# Patient Record
Sex: MALE | Race: WHITE | NOT HISPANIC OR LATINO | Employment: OTHER | URBAN - NONMETROPOLITAN AREA
[De-identification: names, ages, dates, MRNs, and addresses within clinical notes are randomized per-mention and may not be internally consistent; named-entity substitution may affect disease eponyms.]

---

## 2023-02-23 PROBLEM — R35.1 NOCTURIA: Status: ACTIVE | Noted: 2023-02-23

## 2023-02-23 PROBLEM — E03.9 HYPOTHYROID: Status: ACTIVE | Noted: 2023-02-23

## 2023-02-23 PROBLEM — L30.8 PRURITIC DERMATITIS: Status: ACTIVE | Noted: 2023-02-23

## 2023-02-23 PROBLEM — I25.10 CAD (CORONARY ARTERY DISEASE): Status: ACTIVE | Noted: 2023-02-23

## 2023-02-23 PROBLEM — N20.0 KIDNEY STONE: Status: ACTIVE | Noted: 2023-02-23

## 2023-02-23 PROBLEM — E78.5 HYPERLIPIDEMIA: Status: ACTIVE | Noted: 2023-02-23

## 2023-02-23 PROBLEM — E11.9 DIABETES MELLITUS (MULTI): Status: ACTIVE | Noted: 2023-02-23

## 2023-02-23 PROBLEM — M25.561 RIGHT KNEE PAIN: Status: ACTIVE | Noted: 2023-02-23

## 2023-02-23 PROBLEM — F51.01 PRIMARY INSOMNIA: Status: ACTIVE | Noted: 2023-02-23

## 2023-02-23 PROBLEM — M47.817 LUMBOSACRAL SPONDYLOSIS: Status: ACTIVE | Noted: 2023-02-23

## 2023-02-23 PROBLEM — F41.1 ANXIETY, GENERALIZED: Status: ACTIVE | Noted: 2023-02-23

## 2023-02-23 PROBLEM — M47.816 ARTHRITIS OF FACET JOINT OF LUMBAR SPINE: Status: ACTIVE | Noted: 2023-02-23

## 2023-02-23 PROBLEM — M17.11 ARTHRITIS OF KNEE, RIGHT: Status: ACTIVE | Noted: 2023-02-23

## 2023-02-23 PROBLEM — M53.9 MULTILEVEL DEGENERATIVE DISC DISEASE: Status: ACTIVE | Noted: 2023-02-23

## 2023-02-23 PROBLEM — I10 HTN (HYPERTENSION), BENIGN: Status: ACTIVE | Noted: 2023-02-23

## 2023-02-23 PROBLEM — N40.0 BPH (BENIGN PROSTATIC HYPERPLASIA): Status: ACTIVE | Noted: 2023-02-23

## 2023-02-23 PROBLEM — R33.9 URINARY RETENTION: Status: ACTIVE | Noted: 2023-02-23

## 2023-02-23 PROBLEM — R97.20 ELEVATED PSA MEASUREMENT: Status: ACTIVE | Noted: 2023-02-23

## 2023-02-23 PROBLEM — N41.1 CHRONIC PROSTATITIS: Status: ACTIVE | Noted: 2023-02-23

## 2023-02-23 PROBLEM — K21.9 GERD (GASTROESOPHAGEAL REFLUX DISEASE): Status: ACTIVE | Noted: 2023-02-23

## 2023-02-23 PROBLEM — N52.9 ERECTILE DYSFUNCTION: Status: ACTIVE | Noted: 2023-02-23

## 2023-02-23 PROBLEM — C18.9 COLON CANCER (MULTI): Status: ACTIVE | Noted: 2023-02-23

## 2023-02-23 PROBLEM — N40.0 ENLARGED PROSTATE: Status: ACTIVE | Noted: 2023-02-23

## 2023-02-23 RX ORDER — DEXTROMETHORPHAN HYDROBROMIDE, GUAIFENESIN 5; 100 MG/5ML; MG/5ML
650 LIQUID ORAL
COMMUNITY

## 2023-02-23 RX ORDER — CALCIUM CARBONATE 10GR (648 MG) 648 MG/1
TABLET ORAL
COMMUNITY

## 2023-02-23 RX ORDER — NYSTATIN 100000 U/G
CREAM TOPICAL 2 TIMES DAILY
COMMUNITY
Start: 2021-03-30

## 2023-02-23 RX ORDER — LEVOTHYROXINE SODIUM 137 UG/1
CAPSULE ORAL DAILY
COMMUNITY
End: 2023-04-18 | Stop reason: ALTCHOICE

## 2023-02-23 RX ORDER — SIMVASTATIN 40 MG/1
1 TABLET, FILM COATED ORAL DAILY
COMMUNITY
End: 2023-03-21

## 2023-02-23 RX ORDER — ISOSORBIDE MONONITRATE 30 MG/1
1 TABLET, EXTENDED RELEASE ORAL DAILY
COMMUNITY
End: 2023-03-21

## 2023-02-23 RX ORDER — FINASTERIDE 5 MG/1
1 TABLET, FILM COATED ORAL DAILY
COMMUNITY
Start: 2019-11-25 | End: 2023-11-15 | Stop reason: SDUPTHER

## 2023-02-23 RX ORDER — FUROSEMIDE 20 MG/1
1 TABLET ORAL DAILY
COMMUNITY
Start: 2019-11-25 | End: 2023-04-18 | Stop reason: SDUPTHER

## 2023-03-16 DIAGNOSIS — E03.9 ACQUIRED HYPOTHYROIDISM: Primary | ICD-10-CM

## 2023-03-21 RX ORDER — SIMVASTATIN 40 MG/1
TABLET, FILM COATED ORAL
Qty: 90 TABLET | Refills: 3 | Status: SHIPPED | OUTPATIENT
Start: 2023-03-21 | End: 2024-04-29 | Stop reason: SDUPTHER

## 2023-03-21 RX ORDER — ISOSORBIDE MONONITRATE 30 MG/1
TABLET, EXTENDED RELEASE ORAL
Qty: 90 TABLET | Refills: 3 | Status: SHIPPED | OUTPATIENT
Start: 2023-03-21 | End: 2024-04-29 | Stop reason: SDUPTHER

## 2023-03-21 RX ORDER — LEVOTHYROXINE SODIUM 137 UG/1
TABLET ORAL
Qty: 90 TABLET | Refills: 3 | Status: SHIPPED | OUTPATIENT
Start: 2023-03-21 | End: 2024-04-29 | Stop reason: SDUPTHER

## 2023-04-12 LAB
ESTIMATED AVERAGE GLUCOSE FOR HBA1C: 171 MG/DL
HEMOGLOBIN A1C/HEMOGLOBIN TOTAL IN BLOOD: 7.6 %
PROSTATE SPECIFIC AG (NG/ML) IN SER/PLAS: 10.78 NG/ML (ref 0–4)

## 2023-04-18 ENCOUNTER — OFFICE VISIT (OUTPATIENT)
Dept: PRIMARY CARE | Facility: CLINIC | Age: 88
End: 2023-04-18
Payer: MEDICARE

## 2023-04-18 VITALS
WEIGHT: 170.2 LBS | OXYGEN SATURATION: 95 % | HEIGHT: 65 IN | HEART RATE: 76 BPM | DIASTOLIC BLOOD PRESSURE: 70 MMHG | BODY MASS INDEX: 28.36 KG/M2 | SYSTOLIC BLOOD PRESSURE: 130 MMHG

## 2023-04-18 DIAGNOSIS — C18.9 MALIGNANT NEOPLASM OF COLON, UNSPECIFIED PART OF COLON (MULTI): ICD-10-CM

## 2023-04-18 DIAGNOSIS — E78.2 MIXED HYPERLIPIDEMIA: ICD-10-CM

## 2023-04-18 DIAGNOSIS — I10 HTN (HYPERTENSION), BENIGN: ICD-10-CM

## 2023-04-18 DIAGNOSIS — E11.9 TYPE 2 DIABETES MELLITUS WITHOUT COMPLICATION, WITHOUT LONG-TERM CURRENT USE OF INSULIN (MULTI): Primary | ICD-10-CM

## 2023-04-18 DIAGNOSIS — I25.10 CORONARY ARTERY DISEASE, UNSPECIFIED VESSEL OR LESION TYPE, UNSPECIFIED WHETHER ANGINA PRESENT, UNSPECIFIED WHETHER NATIVE OR TRANSPLANTED HEART: ICD-10-CM

## 2023-04-18 DIAGNOSIS — E03.9 ACQUIRED HYPOTHYROIDISM: ICD-10-CM

## 2023-04-18 DIAGNOSIS — N41.1 CHRONIC PROSTATITIS: ICD-10-CM

## 2023-04-18 PROCEDURE — 3075F SYST BP GE 130 - 139MM HG: CPT | Performed by: FAMILY MEDICINE

## 2023-04-18 PROCEDURE — 3078F DIAST BP <80 MM HG: CPT | Performed by: FAMILY MEDICINE

## 2023-04-18 PROCEDURE — 1036F TOBACCO NON-USER: CPT | Performed by: FAMILY MEDICINE

## 2023-04-18 PROCEDURE — 1160F RVW MEDS BY RX/DR IN RCRD: CPT | Performed by: FAMILY MEDICINE

## 2023-04-18 PROCEDURE — 1157F ADVNC CARE PLAN IN RCRD: CPT | Performed by: FAMILY MEDICINE

## 2023-04-18 PROCEDURE — 99214 OFFICE O/P EST MOD 30 MIN: CPT | Performed by: FAMILY MEDICINE

## 2023-04-18 PROCEDURE — 1159F MED LIST DOCD IN RCRD: CPT | Performed by: FAMILY MEDICINE

## 2023-04-18 RX ORDER — FUROSEMIDE 20 MG/1
20 TABLET ORAL DAILY
Qty: 90 TABLET | Refills: 3 | Status: SHIPPED | OUTPATIENT
Start: 2023-04-18 | End: 2024-04-29 | Stop reason: SDUPTHER

## 2023-04-18 RX ORDER — FUROSEMIDE 20 MG/1
20 TABLET ORAL DAILY
Qty: 90 TABLET | Refills: 3 | Status: SHIPPED | OUTPATIENT
Start: 2023-04-18 | End: 2023-04-18 | Stop reason: SDUPTHER

## 2023-04-18 RX ORDER — SULFAMETHOXAZOLE AND TRIMETHOPRIM 800; 160 MG/1; MG/1
1 TABLET ORAL 2 TIMES DAILY
Qty: 14 TABLET | Refills: 1 | Status: SHIPPED | OUTPATIENT
Start: 2023-04-18 | End: 2023-04-25

## 2023-04-18 ASSESSMENT — ENCOUNTER SYMPTOMS
DEPRESSION: 0
LOSS OF SENSATION IN FEET: 0
OCCASIONAL FEELINGS OF UNSTEADINESS: 0

## 2023-04-18 NOTE — PROGRESS NOTES
"Subjective   Patient ID: Carmen Vinson is a 89 y.o. male who presents for Follow-up (6 MO FU , LEFT GROIN PAIN X Sunday ).    HPI   THINKS A PROSTATE ACHE AT TIMES, flow is ok we will empirically treat with antibiotic and if not improved reevaluate urine  CAD stable with no angina exercises daily no tobacco or alcohol  Hyperlipidemia- is on a statin and a prudent diet.  Hypothyroid- is euthyroid on replacement. Thyroid ros is unremarkable.  HTN-nos meds . No alcohol. no tobacco. + exercise. low salt.  Reviewed recommendation for 150 minutes of exercise per week including 2 days of weight training if over age 50  Home bp avg 140/64  Discussed driving he seems competent  Diabetes A1c is up a little bit but still acceptable for age and no pharmacotherapy  No further colon cancer follow-up as indicated to close HCC  Review of Systems General-no fatigue weight to within 10 pounds  ENT no problems with vision swallowing  Cardiac no chest pains palpitations change in exercise tolerance or capacity  Pulmonary no cough shortness of breath  GI no heartburn or abdominal pain  Musculoskeletal no joint pains    Objective   /70   Pulse 76   Ht 1.651 m (5' 5\")   Wt 77.2 kg (170 lb 3.2 oz)   SpO2 95%   BMI 28.32 kg/m²     Physical Exam  General:  Alert, No acute distress. Appears stated age  Eye:  Pupils are equal, round and reactive to light, Extraocular movements are intact, Normal conjunctiva.    Neck:  Supple, Non-tender, No carotid bruit, No jugular venous distention, No lymphadenopathy, No thyromegaly.    Respiratory:  Lungs are clear to auscultation, Respirations are non-labored, Breath sounds are equal.    Cardiovascular:  Normal rate, Regular rhythm, No murmur.    Gastrointestinal:  Soft, Non-tender, No organomegaly. No solid or pulsatile mass  Integumentary:  Warm, Dry. No concerning lesions on exposed areas  Neurologic:  Alert, Oriented.  Gross and fine motor intact, CN 2-12 intact  Psychiatric:  Cooperative, " Appropriate mood & affect.      Assessment/Plan   Problem List Items Addressed This Visit          Circulatory    CAD (coronary artery disease)    Relevant Medications    furosemide (Lasix) 20 mg tablet    HTN (hypertension), benign       Genitourinary    Chronic prostatitis    Relevant Medications    sulfamethoxazole-trimethoprim (Bactrim DS) 800-160 mg tablet       Endocrine/Metabolic    Diabetes mellitus (CMS/HCC) - Primary    Relevant Orders    Hemoglobin A1C    Follow Up In Primary Care    Hypothyroid       Other    Hyperlipidemia

## 2023-07-13 ENCOUNTER — LAB (OUTPATIENT)
Dept: LAB | Facility: LAB | Age: 88
End: 2023-07-13
Payer: MEDICARE

## 2023-07-13 DIAGNOSIS — E11.9 TYPE 2 DIABETES MELLITUS WITHOUT COMPLICATION, WITHOUT LONG-TERM CURRENT USE OF INSULIN (MULTI): ICD-10-CM

## 2023-07-13 LAB
ESTIMATED AVERAGE GLUCOSE FOR HBA1C: 177 MG/DL
HEMOGLOBIN A1C/HEMOGLOBIN TOTAL IN BLOOD: 7.8 %

## 2023-07-13 PROCEDURE — 83036 HEMOGLOBIN GLYCOSYLATED A1C: CPT

## 2023-07-13 PROCEDURE — 36415 COLL VENOUS BLD VENIPUNCTURE: CPT

## 2023-07-18 ENCOUNTER — OFFICE VISIT (OUTPATIENT)
Dept: PRIMARY CARE | Facility: CLINIC | Age: 88
End: 2023-07-18
Payer: MEDICARE

## 2023-07-18 VITALS
DIASTOLIC BLOOD PRESSURE: 58 MMHG | WEIGHT: 171 LBS | HEIGHT: 65 IN | HEART RATE: 75 BPM | BODY MASS INDEX: 28.49 KG/M2 | SYSTOLIC BLOOD PRESSURE: 120 MMHG | OXYGEN SATURATION: 98 %

## 2023-07-18 DIAGNOSIS — E11.9 TYPE 2 DIABETES MELLITUS WITHOUT COMPLICATION, WITHOUT LONG-TERM CURRENT USE OF INSULIN (MULTI): ICD-10-CM

## 2023-07-18 PROCEDURE — 1159F MED LIST DOCD IN RCRD: CPT | Performed by: FAMILY MEDICINE

## 2023-07-18 PROCEDURE — 3078F DIAST BP <80 MM HG: CPT | Performed by: FAMILY MEDICINE

## 2023-07-18 PROCEDURE — 1036F TOBACCO NON-USER: CPT | Performed by: FAMILY MEDICINE

## 2023-07-18 PROCEDURE — 3074F SYST BP LT 130 MM HG: CPT | Performed by: FAMILY MEDICINE

## 2023-07-18 PROCEDURE — 1160F RVW MEDS BY RX/DR IN RCRD: CPT | Performed by: FAMILY MEDICINE

## 2023-07-18 PROCEDURE — 1157F ADVNC CARE PLAN IN RCRD: CPT | Performed by: FAMILY MEDICINE

## 2023-07-18 PROCEDURE — 99214 OFFICE O/P EST MOD 30 MIN: CPT | Performed by: FAMILY MEDICINE

## 2023-07-18 RX ORDER — PIOGLITAZONEHYDROCHLORIDE 15 MG/1
15 TABLET ORAL DAILY
Qty: 90 TABLET | Refills: 3 | Status: SHIPPED | OUTPATIENT
Start: 2023-07-18 | End: 2024-04-29 | Stop reason: SDUPTHER

## 2023-07-18 NOTE — PROGRESS NOTES
"Subjective   Patient ID: Carmen Vinson is a 89 y.o. male who presents for Follow-up (3 mo fu rev labs ).    HPI   Since the last office visit there have been no interval operations, hospitalizations, important illnesses or injuries.  Had a fall 3 weeks ago opening door, fell backwards, on a slope getting into backseat    Dm trrending popped and A1c is 7.8.  Reviewed options and will begin pioglitazone 15 mg daily with 3-month follow-up A1c  Review of Systems  General-no fatigue weight to within 10 pounds  ENT no problems with vision swallowing  Cardiac no chest pains palpitations change in exercise tolerance or capacity  Pulmonary no cough shortness of breath  GI no heartburn or abdominal pain  Musculoskeletal no joint pains  Objective   /58   Pulse 75   Ht 1.651 m (5' 5\")   Wt 77.6 kg (171 lb)   SpO2 98%   BMI 28.46 kg/m²     Physical Exam  General:  Alert, No acute distress. Appears stated age  Eye:  Pupils are equal, round and reactive to light, Extraocular movements are intact, Normal conjunctiva.    Neck:  Supple, Non-tender, No carotid bruit, No jugular venous distention, No lymphadenopathy, No thyromegaly.    Respiratory:  Lungs are clear to auscultation, Respirations are non-labored, Breath sounds are equal.    Cardiovascular:  Normal rate, Regular rhythm, No murmur.    Gastrointestinal:  Soft, Non-tender, No organomegaly. No solid or pulsatile mass  Integumentary:  Warm, Dry. No concerning lesions on exposed areas  Neurologic:  Alert, Oriented.  Gross and fine motor intact, CN 2-12 intact  Psychiatric:  Cooperative, Appropriate mood & affect.  Assessment/Plan   Problem List Items Addressed This Visit       Diabetes mellitus (CMS/[facility])    Relevant Medications    pioglitazone (Actos) 15 mg tablet    Other Relevant Orders    CBC    Comprehensive Metabolic Panel    Hemoglobin A1C          "

## 2023-10-20 ENCOUNTER — LAB (OUTPATIENT)
Dept: LAB | Facility: LAB | Age: 88
End: 2023-10-20
Payer: MEDICARE

## 2023-10-20 DIAGNOSIS — E11.9 TYPE 2 DIABETES MELLITUS WITHOUT COMPLICATION, WITHOUT LONG-TERM CURRENT USE OF INSULIN (MULTI): ICD-10-CM

## 2023-10-20 DIAGNOSIS — R97.20 ELEVATED PROSTATE SPECIFIC ANTIGEN (PSA): Primary | ICD-10-CM

## 2023-10-20 LAB
ALBUMIN SERPL BCP-MCNC: 4.1 G/DL (ref 3.4–5)
ALP SERPL-CCNC: 45 U/L (ref 33–136)
ALT SERPL W P-5'-P-CCNC: 19 U/L (ref 10–52)
ANION GAP SERPL CALC-SCNC: 12 MMOL/L (ref 10–20)
AST SERPL W P-5'-P-CCNC: 20 U/L (ref 9–39)
BILIRUB SERPL-MCNC: 0.7 MG/DL (ref 0–1.2)
BUN SERPL-MCNC: 24 MG/DL (ref 6–23)
CALCIUM SERPL-MCNC: 9 MG/DL (ref 8.6–10.3)
CHLORIDE SERPL-SCNC: 108 MMOL/L (ref 98–107)
CO2 SERPL-SCNC: 24 MMOL/L (ref 21–32)
CREAT SERPL-MCNC: 1.37 MG/DL (ref 0.5–1.3)
ERYTHROCYTE [DISTWIDTH] IN BLOOD BY AUTOMATED COUNT: 11.5 % (ref 11.5–14.5)
EST. AVERAGE GLUCOSE BLD GHB EST-MCNC: 166 MG/DL
GFR SERPL CREATININE-BSD FRML MDRD: 47 ML/MIN/1.73M*2
GLUCOSE SERPL-MCNC: 130 MG/DL (ref 74–99)
HBA1C MFR BLD: 7.4 %
HCT VFR BLD AUTO: 37.6 % (ref 41–52)
HGB BLD-MCNC: 12.7 G/DL (ref 13.5–17.5)
MCH RBC QN AUTO: 32.4 PG (ref 26–34)
MCHC RBC AUTO-ENTMCNC: 33.8 G/DL (ref 32–36)
MCV RBC AUTO: 96 FL (ref 80–100)
NRBC BLD-RTO: 0 /100 WBCS (ref 0–0)
PLATELET # BLD AUTO: 236 X10*3/UL (ref 150–450)
PMV BLD AUTO: 10.3 FL (ref 7.5–11.5)
POTASSIUM SERPL-SCNC: 3.9 MMOL/L (ref 3.5–5.3)
PROT SERPL-MCNC: 6.6 G/DL (ref 6.4–8.2)
PSA SERPL-MCNC: 11.12 NG/ML
RBC # BLD AUTO: 3.92 X10*6/UL (ref 4.5–5.9)
SODIUM SERPL-SCNC: 140 MMOL/L (ref 136–145)
WBC # BLD AUTO: 8.3 X10*3/UL (ref 4.4–11.3)

## 2023-10-20 PROCEDURE — 83036 HEMOGLOBIN GLYCOSYLATED A1C: CPT

## 2023-10-20 PROCEDURE — 84153 ASSAY OF PSA TOTAL: CPT

## 2023-10-20 PROCEDURE — 80053 COMPREHEN METABOLIC PANEL: CPT

## 2023-10-20 PROCEDURE — 85027 COMPLETE CBC AUTOMATED: CPT

## 2023-10-20 PROCEDURE — 36415 COLL VENOUS BLD VENIPUNCTURE: CPT

## 2023-10-25 ENCOUNTER — OFFICE VISIT (OUTPATIENT)
Dept: PRIMARY CARE | Facility: CLINIC | Age: 88
End: 2023-10-25
Payer: MEDICARE

## 2023-10-25 VITALS
SYSTOLIC BLOOD PRESSURE: 180 MMHG | DIASTOLIC BLOOD PRESSURE: 58 MMHG | BODY MASS INDEX: 29.07 KG/M2 | HEART RATE: 79 BPM | OXYGEN SATURATION: 97 % | WEIGHT: 174.5 LBS | HEIGHT: 65 IN

## 2023-10-25 DIAGNOSIS — E78.2 MIXED HYPERLIPIDEMIA: ICD-10-CM

## 2023-10-25 DIAGNOSIS — C18.9 MALIGNANT NEOPLASM OF COLON, UNSPECIFIED PART OF COLON (MULTI): ICD-10-CM

## 2023-10-25 DIAGNOSIS — E11.9 TYPE 2 DIABETES MELLITUS WITHOUT COMPLICATION, WITHOUT LONG-TERM CURRENT USE OF INSULIN (MULTI): ICD-10-CM

## 2023-10-25 DIAGNOSIS — I25.10 CORONARY ARTERY DISEASE INVOLVING NATIVE HEART WITHOUT ANGINA PECTORIS, UNSPECIFIED VESSEL OR LESION TYPE: ICD-10-CM

## 2023-10-25 DIAGNOSIS — Z00.00 ROUTINE GENERAL MEDICAL EXAMINATION AT HEALTH CARE FACILITY: Primary | ICD-10-CM

## 2023-10-25 DIAGNOSIS — E03.9 ACQUIRED HYPOTHYROIDISM: ICD-10-CM

## 2023-10-25 DIAGNOSIS — I10 HTN (HYPERTENSION), BENIGN: ICD-10-CM

## 2023-10-25 DIAGNOSIS — R97.20 ELEVATED PSA MEASUREMENT: ICD-10-CM

## 2023-10-25 PROCEDURE — 1170F FXNL STATUS ASSESSED: CPT | Performed by: FAMILY MEDICINE

## 2023-10-25 PROCEDURE — G0439 PPPS, SUBSEQ VISIT: HCPCS | Performed by: FAMILY MEDICINE

## 2023-10-25 PROCEDURE — 3077F SYST BP >= 140 MM HG: CPT | Performed by: FAMILY MEDICINE

## 2023-10-25 PROCEDURE — 1160F RVW MEDS BY RX/DR IN RCRD: CPT | Performed by: FAMILY MEDICINE

## 2023-10-25 PROCEDURE — 3078F DIAST BP <80 MM HG: CPT | Performed by: FAMILY MEDICINE

## 2023-10-25 PROCEDURE — 1036F TOBACCO NON-USER: CPT | Performed by: FAMILY MEDICINE

## 2023-10-25 PROCEDURE — 99214 OFFICE O/P EST MOD 30 MIN: CPT | Performed by: FAMILY MEDICINE

## 2023-10-25 PROCEDURE — 1159F MED LIST DOCD IN RCRD: CPT | Performed by: FAMILY MEDICINE

## 2023-10-25 ASSESSMENT — ACTIVITIES OF DAILY LIVING (ADL)
MANAGING_FINANCES: INDEPENDENT
GROCERY_SHOPPING: INDEPENDENT
DOING_HOUSEWORK: INDEPENDENT
BATHING: INDEPENDENT
DRESSING: INDEPENDENT
TAKING_MEDICATION: INDEPENDENT

## 2023-10-25 ASSESSMENT — ENCOUNTER SYMPTOMS
DEPRESSION: 0
OCCASIONAL FEELINGS OF UNSTEADINESS: 1
LOSS OF SENSATION IN FEET: 0

## 2023-10-25 NOTE — LETTER
October 25, 2023    Re: Mr. Carmen Vinson    Sirs:  Please provide to the house delivery for the above-captioned individual for medical reasons.    Sincerely,    Sriram Montesinos MD

## 2023-11-14 ASSESSMENT — ENCOUNTER SYMPTOMS
DIFFICULTY URINATING: 0
ENDOCRINE NEGATIVE: 1
SHORTNESS OF BREATH: 0
FEVER: 0
EYES NEGATIVE: 1
ALLERGIC/IMMUNOLOGIC NEGATIVE: 1
CHILLS: 0
PSYCHIATRIC NEGATIVE: 1
NAUSEA: 0
COUGH: 0

## 2023-11-14 NOTE — PROGRESS NOTES
Subjective   Patient ID: Carmen Vinson is a 89 y.o. male.    HPI  Patient has hx of elevated PSA....Most recent PSA was 11.12 on 10/23. Prior PSA was 10.78 ON 4/23. Prior psa was 9.01 on 11/22. Prior PSA was 8.17 on 11/21..Prior PSA was 7.76 11/20.. Prior PSA was 7.35 (11/19) Previous PSA was 7.0 on 11/18...Pt hx has a FHx of prostate ca(father)... Pt has hx of negative trus bx..BPH sx are mild and stable....Some urgency and frequency....Denies dysuria....Denies hematuria...Nocturia x1....Pt. is taking Proscar...This has helped with LUTS..Hx of prostatitis. No recent sx. ED is chronic..No medication for this. He is taking Isosorbide.       Review of Systems   Constitutional:  Negative for chills and fever.   HENT: Negative.     Eyes: Negative.    Respiratory:  Negative for cough and shortness of breath.    Cardiovascular:  Negative for chest pain and leg swelling.   Gastrointestinal:  Negative for nausea.   Endocrine: Negative.    Genitourinary:  Negative for difficulty urinating.        Negative except for documented in HPI   Allergic/Immunologic: Negative.    Neurological:         Alert & oriented X 3   Hematological:         Denies blood thinners   Psychiatric/Behavioral: Negative.         Objective   Physical Exam  Vitals and nursing note reviewed.   Constitutional:       General: He is not in acute distress.     Appearance: Normal appearance.   Pulmonary:      Effort: Pulmonary effort is normal.   Abdominal:      Tenderness: There is no abdominal tenderness.   Genitourinary:     Comments: Kidneys non palpable bilaterally  Bladder non palpable or tender  Scrotum no mass, No hydrocele  Epididymis- No spermatocele. Non Tender.  Testicles: No mass  Urethra: No discharge  Penis within normal limits... No lesions  Prostate - symmetric, no nodules. Small calcification Left [place]  Seminal Vesicals: No mass.  Sphincter tone: normal  Neurological:      Mental Status: He is alert.         Assessment/Plan   Diagnoses and  all orders for this visit:  Benign prostatic hyperplasia with lower urinary tract symptoms, symptom details unspecified  Elevated PSA measurement  Erectile dysfunction, unspecified erectile dysfunction type  Nocturia      All available PSA values reviewed, Options discussed. Questions answered.  Disucssed MRI and Bx-Given age and long Hx of Elevated PSA will follow  Reviewed CT from 1/23-No evidence of  pathology   Diet changes for prostate health discussed and educational information given. Pros/Cons of prostate health supplements discussed.   Treatment options for LUTS reviewed  Proscar Rx refilled  Discussed timed voiding. Discussed fluid and caffeine intake  Treatment options for ED reviewed.  Lifestyle change to help prevent UTIs discussed. Encouraged fluid intake.    F/U with PSA 6 months

## 2023-11-15 ENCOUNTER — OFFICE VISIT (OUTPATIENT)
Dept: UROLOGY | Facility: CLINIC | Age: 88
End: 2023-11-15
Payer: MEDICARE

## 2023-11-15 VITALS — BODY MASS INDEX: 28.62 KG/M2 | WEIGHT: 172 LBS | RESPIRATION RATE: 16 BRPM

## 2023-11-15 DIAGNOSIS — R97.20 ELEVATED PSA MEASUREMENT: ICD-10-CM

## 2023-11-15 DIAGNOSIS — N52.9 ERECTILE DYSFUNCTION, UNSPECIFIED ERECTILE DYSFUNCTION TYPE: ICD-10-CM

## 2023-11-15 DIAGNOSIS — R35.1 NOCTURIA: ICD-10-CM

## 2023-11-15 DIAGNOSIS — N40.1 BENIGN PROSTATIC HYPERPLASIA WITH LOWER URINARY TRACT SYMPTOMS, SYMPTOM DETAILS UNSPECIFIED: ICD-10-CM

## 2023-11-15 PROCEDURE — 99214 OFFICE O/P EST MOD 30 MIN: CPT | Performed by: UROLOGY

## 2023-11-15 PROCEDURE — 1160F RVW MEDS BY RX/DR IN RCRD: CPT | Performed by: UROLOGY

## 2023-11-15 PROCEDURE — 1159F MED LIST DOCD IN RCRD: CPT | Performed by: UROLOGY

## 2023-11-15 PROCEDURE — 1036F TOBACCO NON-USER: CPT | Performed by: UROLOGY

## 2023-11-15 RX ORDER — FINASTERIDE 5 MG/1
5 TABLET, FILM COATED ORAL DAILY
Qty: 90 TABLET | Refills: 3 | Status: SHIPPED | OUTPATIENT
Start: 2023-11-15 | End: 2024-04-29 | Stop reason: SDUPTHER

## 2024-04-22 ENCOUNTER — LAB (OUTPATIENT)
Dept: LAB | Facility: LAB | Age: 89
End: 2024-04-22
Payer: MEDICARE

## 2024-04-22 DIAGNOSIS — E78.2 MIXED HYPERLIPIDEMIA: ICD-10-CM

## 2024-04-22 DIAGNOSIS — R97.20 ELEVATED PSA MEASUREMENT: ICD-10-CM

## 2024-04-22 DIAGNOSIS — E03.9 ACQUIRED HYPOTHYROIDISM: ICD-10-CM

## 2024-04-22 DIAGNOSIS — E11.9 TYPE 2 DIABETES MELLITUS WITHOUT COMPLICATION, WITHOUT LONG-TERM CURRENT USE OF INSULIN (MULTI): ICD-10-CM

## 2024-04-22 DIAGNOSIS — I10 HTN (HYPERTENSION), BENIGN: ICD-10-CM

## 2024-04-22 DIAGNOSIS — R35.1 NOCTURIA: ICD-10-CM

## 2024-04-22 LAB
ALBUMIN SERPL BCP-MCNC: 4 G/DL (ref 3.4–5)
ALP SERPL-CCNC: 45 U/L (ref 33–136)
ALT SERPL W P-5'-P-CCNC: 22 U/L (ref 10–52)
ANION GAP SERPL CALC-SCNC: 12 MMOL/L (ref 10–20)
AST SERPL W P-5'-P-CCNC: 20 U/L (ref 9–39)
BILIRUB SERPL-MCNC: 0.6 MG/DL (ref 0–1.2)
BUN SERPL-MCNC: 26 MG/DL (ref 6–23)
CALCIUM SERPL-MCNC: 10.1 MG/DL (ref 8.6–10.3)
CHLORIDE SERPL-SCNC: 106 MMOL/L (ref 98–107)
CHOLEST SERPL-MCNC: 123 MG/DL (ref 0–199)
CHOLESTEROL/HDL RATIO: 2.5
CO2 SERPL-SCNC: 27 MMOL/L (ref 21–32)
CREAT SERPL-MCNC: 1.32 MG/DL (ref 0.5–1.3)
CREAT UR-MCNC: 120.7 MG/DL (ref 20–370)
EGFRCR SERPLBLD CKD-EPI 2021: 49 ML/MIN/1.73M*2
ERYTHROCYTE [DISTWIDTH] IN BLOOD BY AUTOMATED COUNT: 11.2 % (ref 11.5–14.5)
EST. AVERAGE GLUCOSE BLD GHB EST-MCNC: 163 MG/DL
GLUCOSE SERPL-MCNC: 128 MG/DL (ref 74–99)
HBA1C MFR BLD: 7.3 %
HCT VFR BLD AUTO: 36 % (ref 41–52)
HDLC SERPL-MCNC: 49 MG/DL
HGB BLD-MCNC: 12.1 G/DL (ref 13.5–17.5)
LDLC SERPL CALC-MCNC: 44 MG/DL
MCH RBC QN AUTO: 32.7 PG (ref 26–34)
MCHC RBC AUTO-ENTMCNC: 33.6 G/DL (ref 32–36)
MCV RBC AUTO: 97 FL (ref 80–100)
MICROALBUMIN UR-MCNC: 443.5 MG/L
MICROALBUMIN/CREAT UR: 367.4 UG/MG CREAT
NON HDL CHOLESTEROL: 74 MG/DL (ref 0–149)
NRBC BLD-RTO: 0 /100 WBCS (ref 0–0)
PLATELET # BLD AUTO: 234 X10*3/UL (ref 150–450)
POTASSIUM SERPL-SCNC: 3.9 MMOL/L (ref 3.5–5.3)
PROT SERPL-MCNC: 6.2 G/DL (ref 6.4–8.2)
PSA SERPL-MCNC: 10.76 NG/ML
RBC # BLD AUTO: 3.7 X10*6/UL (ref 4.5–5.9)
SODIUM SERPL-SCNC: 141 MMOL/L (ref 136–145)
TRIGL SERPL-MCNC: 149 MG/DL (ref 0–149)
TSH SERPL-ACNC: 0.42 MIU/L (ref 0.44–3.98)
VLDL: 30 MG/DL (ref 0–40)
WBC # BLD AUTO: 8 X10*3/UL (ref 4.4–11.3)

## 2024-04-22 PROCEDURE — 84443 ASSAY THYROID STIM HORMONE: CPT

## 2024-04-22 PROCEDURE — 80061 LIPID PANEL: CPT

## 2024-04-22 PROCEDURE — 85027 COMPLETE CBC AUTOMATED: CPT

## 2024-04-22 PROCEDURE — 83036 HEMOGLOBIN GLYCOSYLATED A1C: CPT

## 2024-04-22 PROCEDURE — 82043 UR ALBUMIN QUANTITATIVE: CPT

## 2024-04-22 PROCEDURE — 84153 ASSAY OF PSA TOTAL: CPT

## 2024-04-22 PROCEDURE — 80053 COMPREHEN METABOLIC PANEL: CPT

## 2024-04-22 PROCEDURE — 36415 COLL VENOUS BLD VENIPUNCTURE: CPT

## 2024-04-22 PROCEDURE — 82570 ASSAY OF URINE CREATININE: CPT

## 2024-04-29 ENCOUNTER — OFFICE VISIT (OUTPATIENT)
Dept: PRIMARY CARE | Facility: CLINIC | Age: 89
End: 2024-04-29
Payer: MEDICARE

## 2024-04-29 VITALS
OXYGEN SATURATION: 98 % | HEIGHT: 65 IN | WEIGHT: 173.9 LBS | BODY MASS INDEX: 28.97 KG/M2 | SYSTOLIC BLOOD PRESSURE: 132 MMHG | DIASTOLIC BLOOD PRESSURE: 60 MMHG | HEART RATE: 76 BPM

## 2024-04-29 DIAGNOSIS — I10 HTN (HYPERTENSION), BENIGN: ICD-10-CM

## 2024-04-29 DIAGNOSIS — I25.10 CORONARY ARTERY DISEASE INVOLVING NATIVE HEART WITHOUT ANGINA PECTORIS, UNSPECIFIED VESSEL OR LESION TYPE: Primary | ICD-10-CM

## 2024-04-29 DIAGNOSIS — E78.2 MIXED HYPERLIPIDEMIA: ICD-10-CM

## 2024-04-29 DIAGNOSIS — E03.9 ACQUIRED HYPOTHYROIDISM: ICD-10-CM

## 2024-04-29 DIAGNOSIS — C18.9 MALIGNANT NEOPLASM OF COLON, UNSPECIFIED PART OF COLON (MULTI): ICD-10-CM

## 2024-04-29 DIAGNOSIS — N40.1 BENIGN PROSTATIC HYPERPLASIA WITH LOWER URINARY TRACT SYMPTOMS, SYMPTOM DETAILS UNSPECIFIED: ICD-10-CM

## 2024-04-29 DIAGNOSIS — R21 RASH: ICD-10-CM

## 2024-04-29 DIAGNOSIS — R97.20 ELEVATED PSA MEASUREMENT: ICD-10-CM

## 2024-04-29 DIAGNOSIS — E11.9 TYPE 2 DIABETES MELLITUS WITHOUT COMPLICATION, WITHOUT LONG-TERM CURRENT USE OF INSULIN (MULTI): ICD-10-CM

## 2024-04-29 DIAGNOSIS — I25.10 CORONARY ARTERY DISEASE, UNSPECIFIED VESSEL OR LESION TYPE, UNSPECIFIED WHETHER ANGINA PRESENT, UNSPECIFIED WHETHER NATIVE OR TRANSPLANTED HEART: ICD-10-CM

## 2024-04-29 PROCEDURE — 3078F DIAST BP <80 MM HG: CPT | Performed by: FAMILY MEDICINE

## 2024-04-29 PROCEDURE — 1159F MED LIST DOCD IN RCRD: CPT | Performed by: FAMILY MEDICINE

## 2024-04-29 PROCEDURE — 99214 OFFICE O/P EST MOD 30 MIN: CPT | Performed by: FAMILY MEDICINE

## 2024-04-29 PROCEDURE — 1160F RVW MEDS BY RX/DR IN RCRD: CPT | Performed by: FAMILY MEDICINE

## 2024-04-29 PROCEDURE — 1036F TOBACCO NON-USER: CPT | Performed by: FAMILY MEDICINE

## 2024-04-29 PROCEDURE — 1157F ADVNC CARE PLAN IN RCRD: CPT | Performed by: FAMILY MEDICINE

## 2024-04-29 PROCEDURE — 3075F SYST BP GE 130 - 139MM HG: CPT | Performed by: FAMILY MEDICINE

## 2024-04-29 RX ORDER — ISOSORBIDE MONONITRATE 30 MG/1
30 TABLET, EXTENDED RELEASE ORAL DAILY
Qty: 90 TABLET | Refills: 3 | Status: SHIPPED | OUTPATIENT
Start: 2024-04-29 | End: 2025-04-29

## 2024-04-29 RX ORDER — PIOGLITAZONEHYDROCHLORIDE 15 MG/1
15 TABLET ORAL DAILY
Qty: 90 TABLET | Refills: 3 | Status: SHIPPED | OUTPATIENT
Start: 2024-04-29 | End: 2025-04-29

## 2024-04-29 RX ORDER — MOMETASONE FUROATE 1 MG/ML
SOLUTION TOPICAL DAILY
Qty: 60 ML | Refills: 11 | Status: SHIPPED | OUTPATIENT
Start: 2024-04-29 | End: 2025-04-29

## 2024-04-29 RX ORDER — SIMVASTATIN 40 MG/1
40 TABLET, FILM COATED ORAL DAILY
Qty: 90 TABLET | Refills: 3 | Status: SHIPPED | OUTPATIENT
Start: 2024-04-29 | End: 2025-04-29

## 2024-04-29 RX ORDER — FUROSEMIDE 20 MG/1
20 TABLET ORAL DAILY
Qty: 90 TABLET | Refills: 3 | Status: SHIPPED | OUTPATIENT
Start: 2024-04-29 | End: 2025-04-29

## 2024-04-29 RX ORDER — FINASTERIDE 5 MG/1
5 TABLET, FILM COATED ORAL DAILY
Qty: 90 TABLET | Refills: 3 | Status: SHIPPED | OUTPATIENT
Start: 2024-04-29

## 2024-04-29 RX ORDER — LEVOTHYROXINE SODIUM 137 UG/1
137 TABLET ORAL DAILY
Qty: 90 TABLET | Refills: 3 | Status: SHIPPED | OUTPATIENT
Start: 2024-04-29 | End: 2025-04-29

## 2024-04-29 NOTE — PROGRESS NOTES
"Subjective   Patient ID: Carmen Vinson is a 89 y.o. male who presents for Follow-up (6 mo rev labs).    HPI   Since the last office visit there have been no interval operations, hospitalizations, important illnesses or injuries.  \"Some memory slipping\", still balancing finances, works on taxes.  CAD- no angina  HTN-Takes and tolerates meds without side effects. No alcohol. no tobacco. Rides  30 min 6 d a week.  Walks woth walker or cane3 out on the road exercise. low salt.  Reviewed recommendation for 150 minutes of exercise per week including 2 days of weight training if over age 50  Edema L>R titrate lasix 20-40 as needed  Hypothyroid- is euthyroid on replacement. Thyroid ros is unremarkable.    Review of Systems  General-no fatigue weight to within 10 pounds  ENT no problems with vision swallowing  Cardiac no chest pains palpitations change in exercise tolerance or capacity  Pulmonary no cough shortness of breath  GI no heartburn or abdominal pain  Musculoskeletal no joint pains    Objective   /60   Pulse 76   Ht 1.651 m (5' 5\")   Wt 78.9 kg (173 lb 14.4 oz)   SpO2 98%   BMI 28.94 kg/m²     Physical Exam  General:  Alert, No acute distress. Appears stated age  Eye:  Pupils are equal, round and reactive to light, Extraocular movements are intact, Normal conjunctiva.    Neck:  Supple, Non-tender, No carotid bruit, No jugular venous distention, No lymphadenopathy, No thyromegaly.    Respiratory:  Lungs are clear to auscultation, Respirations are non-labored, Breath sounds are equal.    Cardiovascular:  Normal rate, Regular rhythm, No murmur.    Gastrointestinal:  Soft, Non-tender, No organomegaly. No solid or pulsatile mass  Integumentary:  Warm, Dry. No concerning lesions on exposed areas  Neurologic:  Alert, Oriented.  Gross and fine motor intact, CN 2-12 intact  Psychiatric:  Cooperative, Appropriate mood & affect.  Assessment/Plan   Problem List Items Addressed This Visit             ICD-10-CM "    BPH (benign prostatic hyperplasia) N40.0    Relevant Medications    finasteride (Proscar) 5 mg tablet    CAD (coronary artery disease) - Primary I25.10    Relevant Medications    furosemide (Lasix) 20 mg tablet    isosorbide mononitrate ER (Imdur) 30 mg 24 hr tablet    Other Relevant Orders    CBC    Comprehensive Metabolic Panel    Colon cancer (Multi) C18.9    Diabetes mellitus (Multi) E11.9    Relevant Medications    pioglitazone (Actos) 15 mg tablet    Other Relevant Orders    Hemoglobin A1C    Follow Up In Primary Care    Elevated PSA measurement R97.20    HTN (hypertension), benign I10    Hyperlipidemia E78.5    Hypothyroid E03.9    Relevant Medications    isosorbide mononitrate ER (Imdur) 30 mg 24 hr tablet    levothyroxine (Synthroid, Levoxyl) 137 mcg tablet    simvastatin (Zocor) 40 mg tablet     Other Visit Diagnoses         Codes    Rash     R21    Relevant Medications    mometasone (Elocon) 0.1 % lotion

## 2024-05-14 ASSESSMENT — ENCOUNTER SYMPTOMS
ENDOCRINE NEGATIVE: 1
PSYCHIATRIC NEGATIVE: 1
COUGH: 0
SHORTNESS OF BREATH: 0
DIFFICULTY URINATING: 0
FEVER: 0
CHILLS: 0
EYES NEGATIVE: 1
NAUSEA: 0
ALLERGIC/IMMUNOLOGIC NEGATIVE: 1

## 2024-05-14 NOTE — PROGRESS NOTES
Subjective   Patient ID: Carmen Vinson is a 89 y.o. male.    HPI  Patient has hx of elevated PSA....Most recent PSA was 10.76 on 5/24 Prior PSA was 11.12 on 10/23. Prior PSA was 10.78 ON 4/23. Prior psa was 9.01 on 11/22. Prior PSA was 8.17 on 11/21..Prior PSA was 7.76 11/20.. Prior PSA was 7.35 (11/19) Previous PSA was 7.0 on 11/18...Pt hx has a FHx of prostate ca(father)... Pt has hx of negative trus bx..BPH sx are mild and stable....Some urgency and frequency....Denies dysuria....Denies hematuria...Nocturia x1....Pt. is taking Proscar...This has helped with LUTS..Hx of prostatitis. No recent sx. ED is chronic..No medication for this. He is taking Isosorbide.          Review of Systems   Constitutional:  Negative for chills and fever.   HENT: Negative.     Eyes: Negative.    Respiratory:  Negative for cough and shortness of breath.    Cardiovascular:  Negative for chest pain and leg swelling.   Gastrointestinal:  Negative for nausea.   Endocrine: Negative.    Genitourinary:  Negative for difficulty urinating.        Negative except for documented in HPI   Allergic/Immunologic: Negative.    Neurological:         Alert & oriented X 3   Hematological:         Denies blood thinners   Psychiatric/Behavioral: Negative.         Objective   Physical Exam  Vitals and nursing note reviewed.   Constitutional:       General: He is not in acute distress.     Appearance: Normal appearance.   Pulmonary:      Effort: Pulmonary effort is normal.   Abdominal:      Tenderness: There is no abdominal tenderness.   Genitourinary:     Comments: Kidneys non palpable bilaterally  Bladder non palpable or tender  Scrotum no mass, No hydrocele  Epididymis- No spermatocele. Non Tender.  Testicles: No mass. soft  Urethra: No discharge  Penis within normal limits... No lesions  Prostate - Asymmetric, nodule Left Base  Seminal Vesicals: No mass.  Sphincter tone: normal  Neurological:      Mental Status: He is alert.         Assessment/Plan    Diagnoses and all orders for this visit:  Benign prostatic hyperplasia with lower urinary tract symptoms, symptom details unspecified  Elevated PSA measurement  Erectile dysfunction, unspecified erectile dysfunction type  Nocturia      All available PSA values reviewed, Options discussed. Questions answered.  Discussed MARC  Pros/cons of MRI reviewed  Will observe PSA given age   Diet changes for prostate health discussed and educational information given. Pros/Cons of prostate health supplements discussed.   Treatment options for LUTS reviewed  Continue Proscar  Discussed timed voiding. Discussed fluid and caffeine intake  Treatment options for ED reviewed-Observe  Lifestyle change to help prevent UTIs discussed. Encouraged fluid intake.    F/U  6 months with PSA

## 2024-05-15 ENCOUNTER — OFFICE VISIT (OUTPATIENT)
Dept: UROLOGY | Facility: CLINIC | Age: 89
End: 2024-05-15
Payer: MEDICARE

## 2024-05-15 VITALS — HEIGHT: 65 IN | BODY MASS INDEX: 28.99 KG/M2 | WEIGHT: 174 LBS

## 2024-05-15 DIAGNOSIS — N52.9 ERECTILE DYSFUNCTION, UNSPECIFIED ERECTILE DYSFUNCTION TYPE: ICD-10-CM

## 2024-05-15 DIAGNOSIS — N40.1 BENIGN PROSTATIC HYPERPLASIA WITH LOWER URINARY TRACT SYMPTOMS, SYMPTOM DETAILS UNSPECIFIED: ICD-10-CM

## 2024-05-15 DIAGNOSIS — R97.20 ELEVATED PSA MEASUREMENT: ICD-10-CM

## 2024-05-15 DIAGNOSIS — R35.1 NOCTURIA: ICD-10-CM

## 2024-05-15 PROCEDURE — 1160F RVW MEDS BY RX/DR IN RCRD: CPT | Performed by: UROLOGY

## 2024-05-15 PROCEDURE — 99214 OFFICE O/P EST MOD 30 MIN: CPT | Performed by: UROLOGY

## 2024-05-15 PROCEDURE — 1159F MED LIST DOCD IN RCRD: CPT | Performed by: UROLOGY

## 2024-05-15 PROCEDURE — 1157F ADVNC CARE PLAN IN RCRD: CPT | Performed by: UROLOGY

## 2024-05-15 PROCEDURE — 1036F TOBACCO NON-USER: CPT | Performed by: UROLOGY

## 2024-05-15 ASSESSMENT — PATIENT HEALTH QUESTIONNAIRE - PHQ9
1. LITTLE INTEREST OR PLEASURE IN DOING THINGS: NOT AT ALL
SUM OF ALL RESPONSES TO PHQ9 QUESTIONS 1 AND 2: 0
2. FEELING DOWN, DEPRESSED OR HOPELESS: NOT AT ALL

## 2024-07-23 ENCOUNTER — OFFICE VISIT (OUTPATIENT)
Dept: PRIMARY CARE | Facility: CLINIC | Age: 89
End: 2024-07-23
Payer: MEDICARE

## 2024-07-23 VITALS
SYSTOLIC BLOOD PRESSURE: 170 MMHG | WEIGHT: 175.5 LBS | HEIGHT: 65 IN | OXYGEN SATURATION: 99 % | HEART RATE: 74 BPM | DIASTOLIC BLOOD PRESSURE: 50 MMHG | BODY MASS INDEX: 29.24 KG/M2

## 2024-07-23 DIAGNOSIS — I10 HYPERTENSION, UNSPECIFIED TYPE: Primary | ICD-10-CM

## 2024-07-23 PROCEDURE — 99214 OFFICE O/P EST MOD 30 MIN: CPT | Performed by: STUDENT IN AN ORGANIZED HEALTH CARE EDUCATION/TRAINING PROGRAM

## 2024-07-23 PROCEDURE — 3078F DIAST BP <80 MM HG: CPT | Performed by: STUDENT IN AN ORGANIZED HEALTH CARE EDUCATION/TRAINING PROGRAM

## 2024-07-23 PROCEDURE — 1124F ACP DISCUSS-NO DSCNMKR DOCD: CPT | Performed by: STUDENT IN AN ORGANIZED HEALTH CARE EDUCATION/TRAINING PROGRAM

## 2024-07-23 PROCEDURE — 1160F RVW MEDS BY RX/DR IN RCRD: CPT | Performed by: STUDENT IN AN ORGANIZED HEALTH CARE EDUCATION/TRAINING PROGRAM

## 2024-07-23 PROCEDURE — 93000 ELECTROCARDIOGRAM COMPLETE: CPT | Performed by: STUDENT IN AN ORGANIZED HEALTH CARE EDUCATION/TRAINING PROGRAM

## 2024-07-23 PROCEDURE — 1159F MED LIST DOCD IN RCRD: CPT | Performed by: STUDENT IN AN ORGANIZED HEALTH CARE EDUCATION/TRAINING PROGRAM

## 2024-07-23 PROCEDURE — 1157F ADVNC CARE PLAN IN RCRD: CPT | Performed by: STUDENT IN AN ORGANIZED HEALTH CARE EDUCATION/TRAINING PROGRAM

## 2024-07-23 PROCEDURE — 3077F SYST BP >= 140 MM HG: CPT | Performed by: STUDENT IN AN ORGANIZED HEALTH CARE EDUCATION/TRAINING PROGRAM

## 2024-07-23 RX ORDER — LISINOPRIL 10 MG/1
10 TABLET ORAL DAILY
Qty: 30 TABLET | Refills: 2 | Status: SHIPPED | OUTPATIENT
Start: 2024-07-23 | End: 2025-08-27

## 2024-07-23 NOTE — PROGRESS NOTES
Subjective:  Carmen Vinson is a 89 y.o. male who presents to clinic today for Hypertension      He notes that he noticed a few days ago that his blood pressure got higher. He's unsure why its been elevated. 190 systolic at home.170/50 x 2 in office.    He's having no chest pain, SOB, increasing swelling. No headaches, no increased dizziness.     He is currently taking Lasix 20 mg, Imdur 30 mg, no true blood pressure medication  Review of Systems    Assessment/Plan:  Carmen Vinson is a 89 y.o. male with a history of coronary artery disease, colon cancer, hypertension, hyperlipidemia who presents to clinic today to address the following issues:   1. Hypertension, unspecified type  lisinopril 10 mg tablet    ECG 12 lead (Clinic Performed)    CBC    Comprehensive Metabolic Panel    Thyroid Stimulating Hormone    Magnesium        Acute on chronic problem, new to provider, requires further work up and treatment  - significant increase in blood pressure from baseline which was confirmed on multiple manual blood pressures. No red flag symptoms on examination and history today  - due to this will use low dose lisinopril due to his age, I do not want to take a risk of lowering his BP too dramatically  - EKG reassuring  - will obtain blood work as well to look for additional causes    EKG: approx rate 70 bpm, no ST elevation or depression, overall reassuring EKG, sinus rhythm  Problem List Items Addressed This Visit    None  Visit Diagnoses       Hypertension, unspecified type    -  Primary    Relevant Medications    lisinopril 10 mg tablet    Other Relevant Orders    ECG 12 lead (Clinic Performed)    CBC    Comprehensive Metabolic Panel    Thyroid Stimulating Hormone    Magnesium            There are no Patient Instructions on file for this visit.    Follow up: 1-2 weeks     Return precautions discussed.  An After Visit Summary was given to the patient.  All questions were answered and patient in agreement with  "plan.    Objective:  /50   Pulse 74   Ht 1.651 m (5' 5\")   Wt 79.6 kg (175 lb 8 oz)   SpO2 99%   BMI 29.20 kg/m²     Physical Exam  Vitals and nursing note reviewed.   Constitutional:       General: He is not in acute distress.     Appearance: He is not ill-appearing.      Comments: Profoundly hard of hearing   HENT:      Head: Normocephalic and atraumatic.      Mouth/Throat:      Mouth: Mucous membranes are moist.   Eyes:      General: No scleral icterus.        Right eye: No discharge.         Left eye: No discharge.      Extraocular Movements: Extraocular movements intact.      Conjunctiva/sclera: Conjunctivae normal.   Cardiovascular:      Rate and Rhythm: Normal rate and regular rhythm.   Pulmonary:      Effort: Pulmonary effort is normal. No respiratory distress.      Breath sounds: Normal breath sounds.   Abdominal:      General: Abdomen is flat.      Palpations: Abdomen is soft.   Musculoskeletal:      Right lower leg: Edema present.      Left lower leg: Edema present.   Skin:     General: Skin is dry.   Neurological:      General: No focal deficit present.      Mental Status: He is alert and oriented to person, place, and time.   Psychiatric:         Thought Content: Thought content normal.         Judgment: Judgment normal.         I spent 31 minutes in total time for this visit including all related clinical activities before, during, and after the visit excluding other billable activities/procedure time.     Angelina Mishra MD    "

## 2024-07-25 ENCOUNTER — TELEPHONE (OUTPATIENT)
Dept: PRIMARY CARE | Facility: CLINIC | Age: 89
End: 2024-07-25

## 2024-07-25 ENCOUNTER — LAB (OUTPATIENT)
Dept: LAB | Facility: LAB | Age: 89
End: 2024-07-25
Payer: MEDICARE

## 2024-07-25 DIAGNOSIS — I10 HYPERTENSION, UNSPECIFIED TYPE: ICD-10-CM

## 2024-07-25 LAB
ALBUMIN SERPL BCP-MCNC: 3.9 G/DL (ref 3.4–5)
ALP SERPL-CCNC: 43 U/L (ref 33–136)
ALT SERPL W P-5'-P-CCNC: 17 U/L (ref 10–52)
ANION GAP SERPL CALC-SCNC: 13 MMOL/L (ref 10–20)
AST SERPL W P-5'-P-CCNC: 17 U/L (ref 9–39)
BILIRUB SERPL-MCNC: 0.5 MG/DL (ref 0–1.2)
BUN SERPL-MCNC: 30 MG/DL (ref 6–23)
CALCIUM SERPL-MCNC: 9 MG/DL (ref 8.6–10.3)
CHLORIDE SERPL-SCNC: 109 MMOL/L (ref 98–107)
CO2 SERPL-SCNC: 23 MMOL/L (ref 21–32)
CREAT SERPL-MCNC: 1.44 MG/DL (ref 0.5–1.3)
EGFRCR SERPLBLD CKD-EPI 2021: 44 ML/MIN/1.73M*2
ERYTHROCYTE [DISTWIDTH] IN BLOOD BY AUTOMATED COUNT: 11.6 % (ref 11.5–14.5)
GLUCOSE SERPL-MCNC: 133 MG/DL (ref 74–99)
HCT VFR BLD AUTO: 35.8 % (ref 41–52)
HGB BLD-MCNC: 11.8 G/DL (ref 13.5–17.5)
MAGNESIUM SERPL-MCNC: 2.11 MG/DL (ref 1.6–2.4)
MCH RBC QN AUTO: 32.6 PG (ref 26–34)
MCHC RBC AUTO-ENTMCNC: 33 G/DL (ref 32–36)
MCV RBC AUTO: 99 FL (ref 80–100)
NRBC BLD-RTO: 0 /100 WBCS (ref 0–0)
PLATELET # BLD AUTO: 212 X10*3/UL (ref 150–450)
POTASSIUM SERPL-SCNC: 4 MMOL/L (ref 3.5–5.3)
PROT SERPL-MCNC: 6.1 G/DL (ref 6.4–8.2)
RBC # BLD AUTO: 3.62 X10*6/UL (ref 4.5–5.9)
SODIUM SERPL-SCNC: 141 MMOL/L (ref 136–145)
TSH SERPL-ACNC: 0.7 MIU/L (ref 0.44–3.98)
WBC # BLD AUTO: 7.5 X10*3/UL (ref 4.4–11.3)

## 2024-07-25 PROCEDURE — 84443 ASSAY THYROID STIM HORMONE: CPT

## 2024-07-25 PROCEDURE — 85027 COMPLETE CBC AUTOMATED: CPT

## 2024-07-25 PROCEDURE — 83735 ASSAY OF MAGNESIUM: CPT

## 2024-07-25 PROCEDURE — 36415 COLL VENOUS BLD VENIPUNCTURE: CPT

## 2024-07-25 PROCEDURE — 80053 COMPREHEN METABOLIC PANEL: CPT

## 2024-07-25 NOTE — TELEPHONE ENCOUNTER
Deya:    Please call patient to discuss the following labs look stable and okay. Can you ask him how he is feeling and let me know?      Thank you,  Angelina Mishra MD

## 2024-08-06 ENCOUNTER — APPOINTMENT (OUTPATIENT)
Age: 89
End: 2024-08-06
Payer: MEDICARE

## 2024-08-06 VITALS
HEART RATE: 77 BPM | SYSTOLIC BLOOD PRESSURE: 168 MMHG | WEIGHT: 174.2 LBS | DIASTOLIC BLOOD PRESSURE: 60 MMHG | HEIGHT: 65 IN | OXYGEN SATURATION: 97 % | BODY MASS INDEX: 29.02 KG/M2

## 2024-08-06 DIAGNOSIS — I10 HTN (HYPERTENSION), BENIGN: Primary | ICD-10-CM

## 2024-08-06 DIAGNOSIS — N18.32 STAGE 3B CHRONIC KIDNEY DISEASE (MULTI): ICD-10-CM

## 2024-08-06 RX ORDER — LISINOPRIL 5 MG/1
5 TABLET ORAL DAILY
Qty: 90 TABLET | Refills: 3 | Status: SHIPPED | OUTPATIENT
Start: 2024-08-06 | End: 2025-08-06

## 2024-08-06 NOTE — PROGRESS NOTES
"Subjective   Patient ID: Carmen Vinson is a 89 y.o. male who presents for Follow-up (2 wk rev labs).    HPI   Convention, assassination, sport events gets bp up.  Readings ar <150/>60,   had one event <120/<60 and was fatigued.  Will reduce to lisinopril 5 mg  Review of Systems  General-no fatigue weight to within 10 pounds  ENT no problems with vision swallowing  Cardiac no chest pains palpitations change in exercise tolerance or capacity  Pulmonary no cough shortness of breath  GI no heartburn or abdominal pain  Musculoskeletal no joint pains    Objective   /60   Pulse 77   Ht 1.651 m (5' 5\")   Wt 79 kg (174 lb 3.2 oz)   SpO2 97%   BMI 28.99 kg/m²     Physical Exam  General:  Alert, No acute distress. Appears stated age  Eye:  Pupils are equal, round and reactive to light, Extraocular movements are intact, Normal conjunctiva.    Neck:  Supple, Non-tender, No carotid bruit, No jugular venous distention, No lymphadenopathy, No thyromegaly.    Respiratory:  Lungs are clear to auscultation, Respirations are non-labored, Breath sounds are equal.    Cardiovascular:  Normal rate, Regular rhythm, No murmur.    Gastrointestinal:  Soft, Non-tender, No organomegaly. No solid or pulsatile mass    Assessment/Plan   Problem List Items Addressed This Visit             ICD-10-CM    HTN (hypertension), benign - Primary I10    Relevant Medications    lisinopril 5 mg tablet    Other Relevant Orders    Follow Up In Primary Care - Established    Stage 3b chronic kidney disease (Multi) N18.32        Reduce to 5mg  "

## 2024-09-11 ENCOUNTER — APPOINTMENT (OUTPATIENT)
Age: 89
End: 2024-09-11
Payer: MEDICARE

## 2024-09-11 VITALS
HEIGHT: 65 IN | SYSTOLIC BLOOD PRESSURE: 142 MMHG | HEART RATE: 74 BPM | DIASTOLIC BLOOD PRESSURE: 60 MMHG | BODY MASS INDEX: 28.76 KG/M2 | WEIGHT: 172.6 LBS | OXYGEN SATURATION: 97 %

## 2024-09-11 DIAGNOSIS — I10 HTN (HYPERTENSION), BENIGN: ICD-10-CM

## 2024-09-11 PROCEDURE — 1159F MED LIST DOCD IN RCRD: CPT | Performed by: FAMILY MEDICINE

## 2024-09-11 PROCEDURE — 99213 OFFICE O/P EST LOW 20 MIN: CPT | Performed by: FAMILY MEDICINE

## 2024-09-11 PROCEDURE — 1160F RVW MEDS BY RX/DR IN RCRD: CPT | Performed by: FAMILY MEDICINE

## 2024-09-11 PROCEDURE — 3077F SYST BP >= 140 MM HG: CPT | Performed by: FAMILY MEDICINE

## 2024-09-11 PROCEDURE — 1157F ADVNC CARE PLAN IN RCRD: CPT | Performed by: FAMILY MEDICINE

## 2024-09-11 PROCEDURE — 3078F DIAST BP <80 MM HG: CPT | Performed by: FAMILY MEDICINE

## 2024-09-11 PROCEDURE — 1036F TOBACCO NON-USER: CPT | Performed by: FAMILY MEDICINE

## 2024-09-11 NOTE — PROGRESS NOTES
"Subjective   Patient ID: Carmen Vinson is a 89 y.o. male who presents for Follow-up (1 mo).    HPI   Concerned blood pressure is a little too low.  Has a last 6 weeks review showing that the diastolic is consistently below 60.  Reduced lisinopril from 10 to 5-2.5 and now we will stop all lisinopril  Stop 5 mg lisinopril to get disstolic >60.  130/<60.  CAD-0 angina  HTN-Takes and tolerates meds without side effects. No alcohol. no tobacco. no exercise. low salt.  Reviewed recommendation for 150 minutes of exercise per week including 2 days of weight training if over age 50    DM no cks  Review of Systems  As per HPI  Objective   /60   Pulse 74   Ht 1.651 m (5' 5\")   Wt 78.3 kg (172 lb 9.6 oz)   SpO2 97%   BMI 28.72 kg/m²     Physical Exam  Heart regular.  Lungs clear.  Assessment/Plan   Problem List Items Addressed This Visit             ICD-10-CM    HTN (hypertension), benign I10     Keep October appointment, stop all lisinopril     "

## 2024-10-01 ENCOUNTER — HOSPITAL ENCOUNTER (INPATIENT)
Facility: HOSPITAL | Age: 89
LOS: 2 days | Discharge: HOME | End: 2024-10-04
Attending: EMERGENCY MEDICINE | Admitting: INTERNAL MEDICINE
Payer: MEDICARE

## 2024-10-01 ENCOUNTER — APPOINTMENT (OUTPATIENT)
Dept: RADIOLOGY | Facility: HOSPITAL | Age: 89
End: 2024-10-01
Payer: MEDICARE

## 2024-10-01 DIAGNOSIS — R42 DIZZINESS: ICD-10-CM

## 2024-10-01 DIAGNOSIS — E11.9 TYPE 2 DIABETES MELLITUS NOT AT GOAL: ICD-10-CM

## 2024-10-01 DIAGNOSIS — R09.89 OTHER SPECIFIED SYMPTOMS AND SIGNS INVOLVING THE CIRCULATORY AND RESPIRATORY SYSTEMS: ICD-10-CM

## 2024-10-01 DIAGNOSIS — S09.90XA HEAD INJURY, INITIAL ENCOUNTER: Primary | ICD-10-CM

## 2024-10-01 DIAGNOSIS — R42 POSTURAL DIZZINESS WITH NEAR SYNCOPE: ICD-10-CM

## 2024-10-01 DIAGNOSIS — E78.49 OTHER HYPERLIPIDEMIA: ICD-10-CM

## 2024-10-01 DIAGNOSIS — R93.0 ABNORMAL CT OF THE HEAD: ICD-10-CM

## 2024-10-01 DIAGNOSIS — S01.81XA FACIAL LACERATION, INITIAL ENCOUNTER: ICD-10-CM

## 2024-10-01 DIAGNOSIS — R53.1 GENERALIZED WEAKNESS: ICD-10-CM

## 2024-10-01 DIAGNOSIS — F41.9 ANXIETY: ICD-10-CM

## 2024-10-01 DIAGNOSIS — I16.0 HYPERTENSIVE URGENCY: ICD-10-CM

## 2024-10-01 DIAGNOSIS — R55 POSTURAL DIZZINESS WITH NEAR SYNCOPE: ICD-10-CM

## 2024-10-01 LAB
ABO GROUP (TYPE) IN BLOOD: NORMAL
ANION GAP SERPL CALC-SCNC: 12 MMOL/L (ref 10–20)
ANTIBODY SCREEN: NORMAL
BASOPHILS # BLD AUTO: 0.02 X10*3/UL (ref 0–0.1)
BASOPHILS NFR BLD AUTO: 0.3 %
BUN SERPL-MCNC: 25 MG/DL (ref 6–23)
CALCIUM SERPL-MCNC: 9.2 MG/DL (ref 8.6–10.3)
CHLORIDE SERPL-SCNC: 107 MMOL/L (ref 98–107)
CO2 SERPL-SCNC: 24 MMOL/L (ref 21–32)
CREAT SERPL-MCNC: 1.32 MG/DL (ref 0.5–1.3)
EGFRCR SERPLBLD CKD-EPI 2021: 49 ML/MIN/1.73M*2
EOSINOPHIL # BLD AUTO: 0.07 X10*3/UL (ref 0–0.4)
EOSINOPHIL NFR BLD AUTO: 1 %
ERYTHROCYTE [DISTWIDTH] IN BLOOD BY AUTOMATED COUNT: 11.6 % (ref 11.5–14.5)
GLUCOSE SERPL-MCNC: 225 MG/DL (ref 74–99)
HCT VFR BLD AUTO: 32.1 % (ref 41–52)
HGB BLD-MCNC: 11.1 G/DL (ref 13.5–17.5)
IMM GRANULOCYTES # BLD AUTO: 0.03 X10*3/UL (ref 0–0.5)
IMM GRANULOCYTES NFR BLD AUTO: 0.4 % (ref 0–0.9)
LYMPHOCYTES # BLD AUTO: 1.15 X10*3/UL (ref 0.8–3)
LYMPHOCYTES NFR BLD AUTO: 16.9 %
MCH RBC QN AUTO: 33.2 PG (ref 26–34)
MCHC RBC AUTO-ENTMCNC: 34.6 G/DL (ref 32–36)
MCV RBC AUTO: 96 FL (ref 80–100)
MONOCYTES # BLD AUTO: 0.88 X10*3/UL (ref 0.05–0.8)
MONOCYTES NFR BLD AUTO: 13 %
NEUTROPHILS # BLD AUTO: 4.64 X10*3/UL (ref 1.6–5.5)
NEUTROPHILS NFR BLD AUTO: 68.4 %
NRBC BLD-RTO: 0 /100 WBCS (ref 0–0)
PLATELET # BLD AUTO: 213 X10*3/UL (ref 150–450)
POTASSIUM SERPL-SCNC: 3.8 MMOL/L (ref 3.5–5.3)
RBC # BLD AUTO: 3.34 X10*6/UL (ref 4.5–5.9)
RH FACTOR (ANTIGEN D): NORMAL
SODIUM SERPL-SCNC: 139 MMOL/L (ref 136–145)
WBC # BLD AUTO: 6.8 X10*3/UL (ref 4.4–11.3)

## 2024-10-01 PROCEDURE — 2500000005 HC RX 250 GENERAL PHARMACY W/O HCPCS: Performed by: EMERGENCY MEDICINE

## 2024-10-01 PROCEDURE — 96374 THER/PROPH/DIAG INJ IV PUSH: CPT | Mod: 59

## 2024-10-01 PROCEDURE — 90715 TDAP VACCINE 7 YRS/> IM: CPT | Performed by: EMERGENCY MEDICINE

## 2024-10-01 PROCEDURE — 85025 COMPLETE CBC W/AUTO DIFF WBC: CPT | Performed by: EMERGENCY MEDICINE

## 2024-10-01 PROCEDURE — 70450 CT HEAD/BRAIN W/O DYE: CPT

## 2024-10-01 PROCEDURE — 36415 COLL VENOUS BLD VENIPUNCTURE: CPT | Performed by: EMERGENCY MEDICINE

## 2024-10-01 PROCEDURE — 86901 BLOOD TYPING SEROLOGIC RH(D): CPT | Performed by: EMERGENCY MEDICINE

## 2024-10-01 PROCEDURE — 94762 N-INVAS EAR/PLS OXIMTRY CONT: CPT

## 2024-10-01 PROCEDURE — 73130 X-RAY EXAM OF HAND: CPT | Mod: RIGHT SIDE | Performed by: RADIOLOGY

## 2024-10-01 PROCEDURE — 12013 RPR F/E/E/N/L/M 2.6-5.0 CM: CPT | Performed by: EMERGENCY MEDICINE

## 2024-10-01 PROCEDURE — 90471 IMMUNIZATION ADMIN: CPT | Performed by: EMERGENCY MEDICINE

## 2024-10-01 PROCEDURE — 73130 X-RAY EXAM OF HAND: CPT | Mod: RT

## 2024-10-01 PROCEDURE — 86900 BLOOD TYPING SEROLOGIC ABO: CPT | Performed by: EMERGENCY MEDICINE

## 2024-10-01 PROCEDURE — 99285 EMERGENCY DEPT VISIT HI MDM: CPT | Mod: 25

## 2024-10-01 PROCEDURE — 2500000004 HC RX 250 GENERAL PHARMACY W/ HCPCS (ALT 636 FOR OP/ED): Performed by: EMERGENCY MEDICINE

## 2024-10-01 PROCEDURE — 99223 1ST HOSP IP/OBS HIGH 75: CPT | Performed by: INTERNAL MEDICINE

## 2024-10-01 PROCEDURE — 70450 CT HEAD/BRAIN W/O DYE: CPT | Performed by: RADIOLOGY

## 2024-10-01 PROCEDURE — 2500000001 HC RX 250 WO HCPCS SELF ADMINISTERED DRUGS (ALT 637 FOR MEDICARE OP): Performed by: EMERGENCY MEDICINE

## 2024-10-01 PROCEDURE — 72125 CT NECK SPINE W/O DYE: CPT

## 2024-10-01 PROCEDURE — 2550000001 HC RX 255 CONTRASTS: Performed by: EMERGENCY MEDICINE

## 2024-10-01 PROCEDURE — 72125 CT NECK SPINE W/O DYE: CPT | Performed by: RADIOLOGY

## 2024-10-01 PROCEDURE — 80048 BASIC METABOLIC PNL TOTAL CA: CPT | Performed by: EMERGENCY MEDICINE

## 2024-10-01 PROCEDURE — 74177 CT ABD & PELVIS W/CONTRAST: CPT

## 2024-10-01 RX ORDER — LISINOPRIL 5 MG/1
5 TABLET ORAL DAILY
COMMUNITY
End: 2024-10-04 | Stop reason: HOSPADM

## 2024-10-01 RX ORDER — LABETALOL HYDROCHLORIDE 5 MG/ML
20 INJECTION, SOLUTION INTRAVENOUS ONCE
Status: COMPLETED | OUTPATIENT
Start: 2024-10-01 | End: 2024-10-01

## 2024-10-01 RX ORDER — BACITRACIN ZINC 500 UNIT/G
1 OINTMENT IN PACKET (EA) TOPICAL ONCE
Status: COMPLETED | OUTPATIENT
Start: 2024-10-01 | End: 2024-10-01

## 2024-10-01 ASSESSMENT — COLUMBIA-SUICIDE SEVERITY RATING SCALE - C-SSRS
2. HAVE YOU ACTUALLY HAD ANY THOUGHTS OF KILLING YOURSELF?: NO
6. HAVE YOU EVER DONE ANYTHING, STARTED TO DO ANYTHING, OR PREPARED TO DO ANYTHING TO END YOUR LIFE?: NO
1. IN THE PAST MONTH, HAVE YOU WISHED YOU WERE DEAD OR WISHED YOU COULD GO TO SLEEP AND NOT WAKE UP?: NO

## 2024-10-01 ASSESSMENT — PAIN - FUNCTIONAL ASSESSMENT: PAIN_FUNCTIONAL_ASSESSMENT: 0-10

## 2024-10-01 ASSESSMENT — PAIN SCALES - GENERAL: PAINLEVEL_OUTOF10: 0 - NO PAIN

## 2024-10-01 NOTE — ED PROVIDER NOTES
Medical Decision Making  Patient care was taken over by myself at 1900 because of abnormal finding on CT scan of the head and the patient was admitted to the hospital for further evaluation with an MRI.  Patient has no focal neurologic complaints but was complaining dizziness for several days before this fall that brought him to the hospital today.    Amount and/or Complexity of Data Reviewed  ECG/medicine tests: independent interpretation performed.     Details: EKG was interpreted by myself at 8:33 PM reveals sinus rhythm with first-degree AV block and evidence of LVH no other acute ST or T wave changes noted.  The heart rate is 68 bpm with a long TN interval of 226 ms.  QRS durations 88 ms.  The QTc is 444 ms.  Axis is 2 degrees.        Emergency Medicine Transition of Care Note.    I received Carmen Vinson in signout from Dr. Boyd.  Please see the previous ED provider note for all HPI, PE and MDM up to the time of signout at 1900. This is in addition to the primary record.    In brief Carmen Vinson is an 89 y.o. male presenting for   Chief Complaint   Patient presents with    Fall     Brought to ED per [employer] squad from local store after he tripped over his feet and fell striking his head. Is A&Ox4, denies LOC. Needs TD     At the time of signout we were awaiting: CT scan imaging of the chest, abdomen and pelvis with IV contrast.          Final diagnoses:   [S09.90XA] Head injury, initial encounter   [J93.9] Pneumothorax on right           Procedure  Procedures    DO Patel Campbell DO  10/02/24 1942

## 2024-10-01 NOTE — ED PROVIDER NOTES
HPI   Chief Complaint   Patient presents with    Fall     Brought to ED per [employer] squad from local store after he tripped over his feet and fell striking his head. Is A&Ox4, denies LOC. Needs TD       Patient presents to the emergency department after a mechanical fall.  The patient states that he was walking at a retail store and lost his footing causing him to trip and fall.  He did strike his head but did not lose consciousness.  Also injured his right hand.  Unsure of his last tetanus booster.  Recalls the entire incident.  Reports no symptoms such as dizziness, weakness, or lightheadedness that would have provoked the fall.      History provided by:  Patient and EMS personnel   used: No            Patient History   Past Medical History:   Diagnosis Date    Personal history of other diseases of male genital organs 11/09/2019    History of prostatitis    Personal history of other malignant neoplasm of large intestine 11/09/2019    History of malignant neoplasm of colon     Past Surgical History:   Procedure Laterality Date    OTHER SURGICAL HISTORY  06/10/2022    Medial branch block    OTHER SURGICAL HISTORY  09/10/2021    Medial branch block    OTHER SURGICAL HISTORY  10/08/2021    Mohs surgery    OTHER SURGICAL HISTORY  03/19/2020    Thyroidectomy    OTHER SURGICAL HISTORY  03/19/2020    Inguinal hernia repair    OTHER SURGICAL HISTORY  03/19/2020    Knee arthroscopy    OTHER SURGICAL HISTORY  03/19/2020    Kidney surgery    OTHER SURGICAL HISTORY  10/08/2021    Sigmoidoscopy    OTHER SURGICAL HISTORY  03/19/2020    Colectomy    OTHER SURGICAL HISTORY  03/19/2020    Coronary artery bypass graft    OTHER SURGICAL HISTORY  03/19/2020    Knee replacement    OTHER SURGICAL HISTORY  03/19/2020    Colonoscopy     Family History   Problem Relation Name Age of Onset    Other (CARDIAC DISORDER) Mother      Hypertension Mother      Heart attack Mother      Other (CARDIAC DISORDER) Father      Prostate  cancer Father      Heart attack Father      Other (CARDIAC DISORDER) Brother      Heart attack Brother       Social History     Tobacco Use    Smoking status: Never    Smokeless tobacco: Never   Vaping Use    Vaping status: Never Used   Substance Use Topics    Alcohol use: Never    Drug use: Defer       Physical Exam   ED Triage Vitals   Temperature Heart Rate Respirations BP   10/01/24 1556 10/01/24 1556 10/01/24 1556 10/01/24 1556   36.8 °C (98.2 °F) 84 16 (!) 206/121      Pulse Ox Temp src Heart Rate Source Patient Position   10/01/24 1556 -- 10/01/24 1800 --   96 %  Monitor       BP Location FiO2 (%)     -- --             Physical Exam  Vitals and nursing note reviewed.   Constitutional:       General: He is not in acute distress.     Appearance: Normal appearance. He is normal weight. He is not ill-appearing, toxic-appearing or diaphoretic.      Comments: Hard of hearing.  Pleasant and provides a full history.  Not confused.   HENT:      Head: Normocephalic.      Comments: Patient has 2 lacerations just lateral to the left eyebrow and one inferior to the right orbit.  These are better described on the dermatologic exam.     Nose: Nose normal. No rhinorrhea.      Comments: No evidence of epistaxis  Neck:      Comments: Trachea is midline  Cardiovascular:      Rate and Rhythm: Normal rate and regular rhythm.      Heart sounds: No murmur heard.  Pulmonary:      Effort: Pulmonary effort is normal.      Breath sounds: Normal breath sounds. No wheezing.   Abdominal:      General: Abdomen is flat. Bowel sounds are normal. There is no distension.      Palpations: Abdomen is soft.      Tenderness: There is no abdominal tenderness.   Musculoskeletal:         General: No swelling, tenderness or deformity. Normal range of motion.      Cervical back: Normal range of motion.      Comments: Patient is 5/5 muscle strength testing in all 4 extremities.   strengths are equal.  He has full range of motion of all digits of the  right hand.   Skin:     General: Skin is warm and dry.      Findings: No rash.      Comments: Patient has 2 separate 1 cm lacerations that are just superior and lateral to the right eyebrow.  The wound does do separate and these will require closure.  There is no active bleeding.  There is an additional 1 cm laceration just inferior to the right orbit and will also require repair.  Furthermore, there is a 1.2 cm x 1 cm skin avulsion just proximal to the palmar aspect of the MCP joint of the fifth digit of the right hand.  No active bleeding.   Neurological:      General: No focal deficit present.      Mental Status: He is alert and oriented to person, place, and time. Mental status is at baseline.      Cranial Nerves: No cranial nerve deficit.      Sensory: No sensory deficit.   Psychiatric:         Mood and Affect: Mood normal.         Behavior: Behavior normal.         Thought Content: Thought content normal.         Judgment: Judgment normal.           ED Course & MDM   Diagnoses as of 10/01/24 1836   Head injury, initial encounter   Pneumothorax on right                 No data recorded     [place] Coma Scale Score: 15 (10/01/24 1614 : Liyah Resendez RN)                           Medical Decision Making  Patient endorsed no symptoms at the time of arrival other than head pain and right hand pain.  His tetanus was updated.  Out of concern for hyperextension injury as the patient did strike his face I did order a CT neck which showed the incidental finding of a trace apical pneumothorax.  The patient is endorsing no chest pain or shortness of breath.  The patient was placed on 4 L of oxygen and additional imaging was ordered.    I did disclose the incidental finding of questionable infarct on the CT.  The patient is endorsing no new symptoms such as visual disturbance, headache, or limb ataxia.  The patient's wife does state that he has been experiencing dizziness for a week or possibly longer.  For the  above-mentioned rationale and timeline the patient was not made a stroke alert.    The patient was endorsed to the oncoming provider.  Please see their note for interpretation of the pending studies and final disposition.        Procedure  Laceration Repair    Performed by: Ben Boyd DO  Authorized by: Ben Boyd DO    Consent:     Consent obtained:  Verbal    Consent given by:  Patient    Risks, benefits, and alternatives were discussed: yes      Risks discussed:  Infection, need for additional repair, poor cosmetic result and pain    Alternatives discussed:  No treatment  Universal protocol:     Procedure explained and questions answered to patient or proxy's satisfaction: yes      Relevant documents present and verified: yes      Test results available: yes      Imaging studies available: yes      Required blood products, implants, devices, and special equipment available: yes      Site/side marked: yes      Immediately prior to procedure, a time out was called: yes      Patient identity confirmed:  Verbally with patient  Anesthesia:     Anesthesia method:  None  Laceration details:     Location:  Face    Face location:  Forehead    Length (cm):  3.2    Depth (mm):  1  Pre-procedure details:     Preparation:  Patient was prepped and draped in usual sterile fashion  Exploration:     Limited defect created (wound extended): no      Contaminated: no    Treatment:     Area cleansed with:  Shmarya-Clens    Amount of cleaning:  Standard    Visualized foreign bodies/material removed: no      Debridement:  None    Undermining:  None    Scar revision: no    Skin repair:     Repair method:  Tissue adhesive  Approximation:     Approximation:  Close  Repair type:     Repair type:  Simple  Post-procedure details:     Dressing:  Open (no dressing)    Procedure completion:  Tolerated well, no immediate complications  Comments:      A total of 3 lacerations in the right periorbital region were closed.  2 that were  noted to be just superior and lateral to the right eyebrow that measure 1 cm each in length and an additional third laceration that is below the orbit and 1.2 cm in length.  Total of 3.2 cm of closure.       Ben Boyd, DO  10/01/24 1835       Ben Boyd, DO  10/01/24 1836       Ben Boyd, DO  10/01/24 1900

## 2024-10-02 ENCOUNTER — APPOINTMENT (OUTPATIENT)
Dept: CARDIOLOGY | Facility: HOSPITAL | Age: 89
End: 2024-10-02
Payer: MEDICARE

## 2024-10-02 ENCOUNTER — APPOINTMENT (OUTPATIENT)
Dept: RADIOLOGY | Facility: HOSPITAL | Age: 89
End: 2024-10-02
Payer: MEDICARE

## 2024-10-02 ENCOUNTER — APPOINTMENT (OUTPATIENT)
Dept: VASCULAR MEDICINE | Facility: HOSPITAL | Age: 89
End: 2024-10-02
Payer: MEDICARE

## 2024-10-02 LAB
AORTIC VALVE MEAN GRADIENT: 6 MMHG
AORTIC VALVE PEAK VELOCITY: 1.66 M/S
ATRIAL RATE: 68 BPM
AV PEAK GRADIENT: 11 MMHG
AVA (PEAK VEL): 2.04 CM2
AVA (VTI): 2.01 CM2
CHOLEST SERPL-MCNC: 102 MG/DL (ref 0–199)
CHOLESTEROL/HDL RATIO: 2.2
EJECTION FRACTION APICAL 4 CHAMBER: 57.5
EJECTION FRACTION: 58 %
EST. AVERAGE GLUCOSE BLD GHB EST-MCNC: 154 MG/DL
GLUCOSE BLD MANUAL STRIP-MCNC: 144 MG/DL (ref 74–99)
GLUCOSE BLD MANUAL STRIP-MCNC: 145 MG/DL (ref 74–99)
GLUCOSE BLD MANUAL STRIP-MCNC: 237 MG/DL (ref 74–99)
HBA1C MFR BLD: 7 %
HDLC SERPL-MCNC: 46 MG/DL
HOLD SPECIMEN: NORMAL
HOLD SPECIMEN: NORMAL
LDLC SERPL CALC-MCNC: 35 MG/DL
LEFT ATRIUM VOLUME AREA LENGTH INDEX BSA: 22 ML/M2
LEFT VENTRICLE INTERNAL DIMENSION DIASTOLE: 3.2 CM (ref 3.5–6)
LEFT VENTRICULAR OUTFLOW TRACT DIAMETER: 1.7 CM
LV EJECTION FRACTION BIPLANE: 54 %
MITRAL VALVE E/A RATIO: 1.13
NON HDL CHOLESTEROL: 56 MG/DL (ref 0–149)
P AXIS: 20 DEGREES
P OFFSET: 140 MS
P ONSET: 104 MS
PR INTERVAL: 226 MS
Q ONSET: 217 MS
QRS COUNT: 11 BEATS
QRS DURATION: 88 MS
QT INTERVAL: 418 MS
QTC CALCULATION(BAZETT): 444 MS
QTC FREDERICIA: 436 MS
R AXIS: 2 DEGREES
RIGHT VENTRICLE PEAK SYSTOLIC PRESSURE: 49 MMHG
T AXIS: 41 DEGREES
T OFFSET: 426 MS
TRICUSPID ANNULAR PLANE SYSTOLIC EXCURSION: 1.2 CM
TRIGL SERPL-MCNC: 107 MG/DL (ref 0–149)
VENTRICULAR RATE: 68 BPM
VLDL: 21 MG/DL (ref 0–40)

## 2024-10-02 PROCEDURE — 70551 MRI BRAIN STEM W/O DYE: CPT

## 2024-10-02 PROCEDURE — 83036 HEMOGLOBIN GLYCOSYLATED A1C: CPT | Mod: SAMLAB | Performed by: INTERNAL MEDICINE

## 2024-10-02 PROCEDURE — 94664 DEMO&/EVAL PT USE INHALER: CPT

## 2024-10-02 PROCEDURE — 82947 ASSAY GLUCOSE BLOOD QUANT: CPT

## 2024-10-02 PROCEDURE — 1200000002 HC GENERAL ROOM WITH TELEMETRY DAILY

## 2024-10-02 PROCEDURE — 2500000005 HC RX 250 GENERAL PHARMACY W/O HCPCS: Performed by: INTERNAL MEDICINE

## 2024-10-02 PROCEDURE — 93880 EXTRACRANIAL BILAT STUDY: CPT | Performed by: STUDENT IN AN ORGANIZED HEALTH CARE EDUCATION/TRAINING PROGRAM

## 2024-10-02 PROCEDURE — 2500000001 HC RX 250 WO HCPCS SELF ADMINISTERED DRUGS (ALT 637 FOR MEDICARE OP): Performed by: INTERNAL MEDICINE

## 2024-10-02 PROCEDURE — 70544 MR ANGIOGRAPHY HEAD W/O DYE: CPT

## 2024-10-02 PROCEDURE — 97162 PT EVAL MOD COMPLEX 30 MIN: CPT | Mod: GP

## 2024-10-02 PROCEDURE — 93306 TTE W/DOPPLER COMPLETE: CPT | Performed by: STUDENT IN AN ORGANIZED HEALTH CARE EDUCATION/TRAINING PROGRAM

## 2024-10-02 PROCEDURE — 97165 OT EVAL LOW COMPLEX 30 MIN: CPT | Mod: GO

## 2024-10-02 PROCEDURE — 93880 EXTRACRANIAL BILAT STUDY: CPT

## 2024-10-02 PROCEDURE — 93306 TTE W/DOPPLER COMPLETE: CPT

## 2024-10-02 PROCEDURE — 36415 COLL VENOUS BLD VENIPUNCTURE: CPT | Performed by: INTERNAL MEDICINE

## 2024-10-02 PROCEDURE — 83718 ASSAY OF LIPOPROTEIN: CPT | Performed by: INTERNAL MEDICINE

## 2024-10-02 PROCEDURE — 2500000002 HC RX 250 W HCPCS SELF ADMINISTERED DRUGS (ALT 637 FOR MEDICARE OP, ALT 636 FOR OP/ED): Performed by: INTERNAL MEDICINE

## 2024-10-02 PROCEDURE — 99232 SBSQ HOSP IP/OBS MODERATE 35: CPT | Performed by: INTERNAL MEDICINE

## 2024-10-02 PROCEDURE — 94760 N-INVAS EAR/PLS OXIMETRY 1: CPT

## 2024-10-02 PROCEDURE — 94761 N-INVAS EAR/PLS OXIMETRY MLT: CPT

## 2024-10-02 PROCEDURE — 2500000004 HC RX 250 GENERAL PHARMACY W/ HCPCS (ALT 636 FOR OP/ED): Performed by: INTERNAL MEDICINE

## 2024-10-02 PROCEDURE — 9420000001 HC RT PATIENT EDUCATION 5 MIN

## 2024-10-02 RX ORDER — ISOSORBIDE MONONITRATE 30 MG/1
30 TABLET, EXTENDED RELEASE ORAL DAILY
Status: DISCONTINUED | OUTPATIENT
Start: 2024-10-02 | End: 2024-10-04 | Stop reason: HOSPADM

## 2024-10-02 RX ORDER — ASPIRIN 81 MG/1
81 TABLET ORAL DAILY
Status: DISCONTINUED | OUTPATIENT
Start: 2024-10-02 | End: 2024-10-04 | Stop reason: HOSPADM

## 2024-10-02 RX ORDER — ACETAMINOPHEN 325 MG/1
650 TABLET ORAL EVERY 4 HOURS PRN
Status: DISCONTINUED | OUTPATIENT
Start: 2024-10-02 | End: 2024-10-04 | Stop reason: HOSPADM

## 2024-10-02 RX ORDER — LABETALOL HYDROCHLORIDE 5 MG/ML
10 INJECTION, SOLUTION INTRAVENOUS EVERY 10 MIN PRN
Status: ACTIVE | OUTPATIENT
Start: 2024-10-02 | End: 2024-10-04

## 2024-10-02 RX ORDER — ACETAMINOPHEN 160 MG/5ML
650 SOLUTION ORAL EVERY 4 HOURS PRN
Status: DISCONTINUED | OUTPATIENT
Start: 2024-10-02 | End: 2024-10-04 | Stop reason: HOSPADM

## 2024-10-02 RX ORDER — ACETAMINOPHEN 650 MG/1
650 SUPPOSITORY RECTAL EVERY 4 HOURS PRN
Status: DISCONTINUED | OUTPATIENT
Start: 2024-10-02 | End: 2024-10-04 | Stop reason: HOSPADM

## 2024-10-02 RX ORDER — HYDRALAZINE HYDROCHLORIDE 20 MG/ML
10 INJECTION INTRAMUSCULAR; INTRAVENOUS
Status: DISCONTINUED | OUTPATIENT
Start: 2024-10-02 | End: 2024-10-03

## 2024-10-02 RX ORDER — FINASTERIDE 5 MG/1
5 TABLET, FILM COATED ORAL DAILY
Status: DISCONTINUED | OUTPATIENT
Start: 2024-10-02 | End: 2024-10-04 | Stop reason: HOSPADM

## 2024-10-02 RX ORDER — INSULIN LISPRO 100 [IU]/ML
0-10 INJECTION, SOLUTION INTRAVENOUS; SUBCUTANEOUS
Status: DISCONTINUED | OUTPATIENT
Start: 2024-10-02 | End: 2024-10-04 | Stop reason: HOSPADM

## 2024-10-02 RX ORDER — SIMVASTATIN 20 MG/1
40 TABLET, FILM COATED ORAL DAILY
Status: DISCONTINUED | OUTPATIENT
Start: 2024-10-02 | End: 2024-10-02

## 2024-10-02 RX ORDER — DEXTROSE 50 % IN WATER (D50W) INTRAVENOUS SYRINGE
25
Status: DISCONTINUED | OUTPATIENT
Start: 2024-10-02 | End: 2024-10-04 | Stop reason: HOSPADM

## 2024-10-02 RX ORDER — ATORVASTATIN CALCIUM 40 MG/1
40 TABLET, FILM COATED ORAL NIGHTLY
Status: DISCONTINUED | OUTPATIENT
Start: 2024-10-02 | End: 2024-10-04 | Stop reason: HOSPADM

## 2024-10-02 RX ORDER — LISINOPRIL 5 MG/1
5 TABLET ORAL DAILY
Status: DISCONTINUED | OUTPATIENT
Start: 2024-10-02 | End: 2024-10-03

## 2024-10-02 RX ORDER — ONDANSETRON HYDROCHLORIDE 2 MG/ML
4 INJECTION, SOLUTION INTRAVENOUS EVERY 8 HOURS PRN
Status: DISCONTINUED | OUTPATIENT
Start: 2024-10-02 | End: 2024-10-04 | Stop reason: HOSPADM

## 2024-10-02 RX ORDER — ONDANSETRON 4 MG/1
4 TABLET, ORALLY DISINTEGRATING ORAL EVERY 8 HOURS PRN
Status: DISCONTINUED | OUTPATIENT
Start: 2024-10-02 | End: 2024-10-04 | Stop reason: HOSPADM

## 2024-10-02 RX ORDER — MAGNESIUM HYDROXIDE 2400 MG/10ML
10 SUSPENSION ORAL DAILY PRN
Status: DISCONTINUED | OUTPATIENT
Start: 2024-10-02 | End: 2024-10-04 | Stop reason: HOSPADM

## 2024-10-02 RX ORDER — HYDRALAZINE HYDROCHLORIDE 25 MG/1
25 TABLET, FILM COATED ORAL EVERY 6 HOURS PRN
Status: DISCONTINUED | OUTPATIENT
Start: 2024-10-04 | End: 2024-10-03

## 2024-10-02 RX ORDER — DEXTROSE 50 % IN WATER (D50W) INTRAVENOUS SYRINGE
12.5
Status: DISCONTINUED | OUTPATIENT
Start: 2024-10-02 | End: 2024-10-04 | Stop reason: HOSPADM

## 2024-10-02 SDOH — SOCIAL STABILITY: SOCIAL NETWORK: ARE YOU MARRIED, WIDOWED, DIVORCED, SEPARATED, NEVER MARRIED, OR LIVING WITH A PARTNER?: MARRIED

## 2024-10-02 SDOH — SOCIAL STABILITY: SOCIAL INSECURITY: HAS ANYONE EVER THREATENED TO HURT YOUR FAMILY OR YOUR PETS?: NO

## 2024-10-02 SDOH — SOCIAL STABILITY: SOCIAL INSECURITY: DO YOU FEEL ANYONE HAS EXPLOITED OR TAKEN ADVANTAGE OF YOU FINANCIALLY OR OF YOUR PERSONAL PROPERTY?: NO

## 2024-10-02 SDOH — SOCIAL STABILITY: SOCIAL INSECURITY: WITHIN THE LAST YEAR, HAVE YOU BEEN HUMILIATED OR EMOTIONALLY ABUSED IN OTHER WAYS BY YOUR PARTNER OR EX-PARTNER?: NO

## 2024-10-02 SDOH — ECONOMIC STABILITY: FOOD INSECURITY: WITHIN THE PAST 12 MONTHS, YOU WORRIED THAT YOUR FOOD WOULD RUN OUT BEFORE YOU GOT MONEY TO BUY MORE.: NEVER TRUE

## 2024-10-02 SDOH — ECONOMIC STABILITY: INCOME INSECURITY: IN THE LAST 12 MONTHS, WAS THERE A TIME WHEN YOU WERE NOT ABLE TO PAY THE MORTGAGE OR RENT ON TIME?: NO

## 2024-10-02 SDOH — HEALTH STABILITY: MENTAL HEALTH: HOW MANY STANDARD DRINKS CONTAINING ALCOHOL DO YOU HAVE ON A TYPICAL DAY?: PATIENT DOES NOT DRINK

## 2024-10-02 SDOH — SOCIAL STABILITY: SOCIAL INSECURITY: ABUSE: ADULT

## 2024-10-02 SDOH — HEALTH STABILITY: MENTAL HEALTH: HOW OFTEN DO YOU HAVE A DRINK CONTAINING ALCOHOL?: NEVER

## 2024-10-02 SDOH — HEALTH STABILITY: MENTAL HEALTH: HOW OFTEN DO YOU HAVE 6 OR MORE DRINKS ON ONE OCCASION?: NEVER

## 2024-10-02 SDOH — HEALTH STABILITY: PHYSICAL HEALTH: ON AVERAGE, HOW MANY MINUTES DO YOU ENGAGE IN EXERCISE AT THIS LEVEL?: 0 MIN

## 2024-10-02 SDOH — SOCIAL STABILITY: SOCIAL INSECURITY: ARE YOU OR HAVE YOU BEEN THREATENED OR ABUSED PHYSICALLY, EMOTIONALLY, OR SEXUALLY BY ANYONE?: NO

## 2024-10-02 SDOH — SOCIAL STABILITY: SOCIAL INSECURITY: ARE YOU OR HAVE YOU BEEN THREATENED OR ABUSED PHYSICALLY, EMOTIONALLY, OR SEXUALLY BY ANYONE?: YES

## 2024-10-02 SDOH — SOCIAL STABILITY: SOCIAL NETWORK: HOW OFTEN DO YOU ATTENT MEETINGS OF THE CLUB OR ORGANIZATION YOU BELONG TO?: PATIENT UNABLE TO ANSWER

## 2024-10-02 SDOH — SOCIAL STABILITY: SOCIAL NETWORK: IN A TYPICAL WEEK, HOW MANY TIMES DO YOU TALK ON THE PHONE WITH FAMILY, FRIENDS, OR NEIGHBORS?: PATIENT UNABLE TO ANSWER

## 2024-10-02 SDOH — SOCIAL STABILITY: SOCIAL INSECURITY: ARE THERE ANY APPARENT SIGNS OF INJURIES/BEHAVIORS THAT COULD BE RELATED TO ABUSE/NEGLECT?: NO

## 2024-10-02 SDOH — ECONOMIC STABILITY: TRANSPORTATION INSECURITY
IN THE PAST 12 MONTHS, HAS THE LACK OF TRANSPORTATION KEPT YOU FROM MEDICAL APPOINTMENTS OR FROM GETTING MEDICATIONS?: NO

## 2024-10-02 SDOH — SOCIAL STABILITY: SOCIAL INSECURITY: HAVE YOU HAD THOUGHTS OF HARMING ANYONE ELSE?: NO

## 2024-10-02 SDOH — HEALTH STABILITY: MENTAL HEALTH
HOW OFTEN DO YOU NEED TO HAVE SOMEONE HELP YOU WHEN YOU READ INSTRUCTIONS, PAMPHLETS, OR OTHER WRITTEN MATERIAL FROM YOUR DOCTOR OR PHARMACY?: SOMETIMES

## 2024-10-02 SDOH — ECONOMIC STABILITY: FOOD INSECURITY: WITHIN THE PAST 12 MONTHS, THE FOOD YOU BOUGHT JUST DIDN'T LAST AND YOU DIDN'T HAVE MONEY TO GET MORE.: NEVER TRUE

## 2024-10-02 SDOH — SOCIAL STABILITY: SOCIAL INSECURITY
WITHIN THE LAST YEAR, HAVE TO BEEN RAPED OR FORCED TO HAVE ANY KIND OF SEXUAL ACTIVITY BY YOUR PARTNER OR EX-PARTNER?: NO

## 2024-10-02 SDOH — ECONOMIC STABILITY: INCOME INSECURITY: IN THE PAST 12 MONTHS, HAS THE ELECTRIC, GAS, OIL, OR WATER COMPANY THREATENED TO SHUT OFF SERVICE IN YOUR HOME?: NO

## 2024-10-02 SDOH — SOCIAL STABILITY: SOCIAL INSECURITY: HAVE YOU HAD ANY THOUGHTS OF HARMING ANYONE ELSE?: YES

## 2024-10-02 SDOH — SOCIAL STABILITY: SOCIAL INSECURITY
WITHIN THE LAST YEAR, HAVE YOU BEEN KICKED, HIT, SLAPPED, OR OTHERWISE PHYSICALLY HURT BY YOUR PARTNER OR EX-PARTNER?: NO

## 2024-10-02 SDOH — HEALTH STABILITY: MENTAL HEALTH
STRESS IS WHEN SOMEONE FEELS TENSE, NERVOUS, ANXIOUS, OR CAN'T SLEEP AT NIGHT BECAUSE THEIR MIND IS TROUBLED. HOW STRESSED ARE YOU?: PATIENT UNABLE TO ANSWER

## 2024-10-02 SDOH — SOCIAL STABILITY: SOCIAL NETWORK
DO YOU BELONG TO ANY CLUBS OR ORGANIZATIONS SUCH AS CHURCH GROUPS UNIONS, FRATERNAL OR ATHLETIC GROUPS, OR SCHOOL GROUPS?: PATIENT UNABLE TO ANSWER

## 2024-10-02 SDOH — SOCIAL STABILITY: SOCIAL INSECURITY: DO YOU FEEL UNSAFE GOING BACK TO THE PLACE WHERE YOU ARE LIVING?: NO

## 2024-10-02 SDOH — SOCIAL STABILITY: SOCIAL INSECURITY: WITHIN THE LAST YEAR, HAVE YOU BEEN AFRAID OF YOUR PARTNER OR EX-PARTNER?: NO

## 2024-10-02 SDOH — SOCIAL STABILITY: SOCIAL INSECURITY: HAVE YOU HAD ANY THOUGHTS OF HARMING ANYONE ELSE?: NO

## 2024-10-02 SDOH — SOCIAL STABILITY: SOCIAL INSECURITY: WERE YOU ABLE TO COMPLETE ALL THE BEHAVIORAL HEALTH SCREENINGS?: YES

## 2024-10-02 SDOH — SOCIAL STABILITY: SOCIAL INSECURITY: DOES ANYONE TRY TO KEEP YOU FROM HAVING/CONTACTING OTHER FRIENDS OR DOING THINGS OUTSIDE YOUR HOME?: NO

## 2024-10-02 SDOH — ECONOMIC STABILITY: HOUSING INSECURITY: IN THE PAST 12 MONTHS, HOW MANY TIMES HAVE YOU MOVED WHERE YOU WERE LIVING?: 0

## 2024-10-02 SDOH — ECONOMIC STABILITY: INCOME INSECURITY: HOW HARD IS IT FOR YOU TO PAY FOR THE VERY BASICS LIKE FOOD, HOUSING, MEDICAL CARE, AND HEATING?: NOT HARD AT ALL

## 2024-10-02 SDOH — SOCIAL STABILITY: SOCIAL NETWORK: HOW OFTEN DO YOU GET TOGETHER WITH FRIENDS OR RELATIVES?: PATIENT UNABLE TO ANSWER

## 2024-10-02 SDOH — SOCIAL STABILITY: SOCIAL NETWORK: HOW OFTEN DO YOU ATTEND CHURCH OR RELIGIOUS SERVICES?: 1 TO 4 TIMES PER YEAR

## 2024-10-02 SDOH — SOCIAL STABILITY: SOCIAL INSECURITY: HAS ANYONE EVER THREATENED TO HURT YOUR FAMILY OR YOUR PETS?: YES

## 2024-10-02 SDOH — ECONOMIC STABILITY: HOUSING INSECURITY: AT ANY TIME IN THE PAST 12 MONTHS, WERE YOU HOMELESS OR LIVING IN A SHELTER (INCLUDING NOW)?: NO

## 2024-10-02 SDOH — ECONOMIC STABILITY: TRANSPORTATION INSECURITY
IN THE PAST 12 MONTHS, HAS LACK OF TRANSPORTATION KEPT YOU FROM MEETINGS, WORK, OR FROM GETTING THINGS NEEDED FOR DAILY LIVING?: NO

## 2024-10-02 SDOH — HEALTH STABILITY: PHYSICAL HEALTH: ON AVERAGE, HOW MANY DAYS PER WEEK DO YOU ENGAGE IN MODERATE TO STRENUOUS EXERCISE (LIKE A BRISK WALK)?: 0 DAYS

## 2024-10-02 ASSESSMENT — COGNITIVE AND FUNCTIONAL STATUS - GENERAL
MOBILITY SCORE: 20
STANDING UP FROM CHAIR USING ARMS: A LITTLE
WALKING IN HOSPITAL ROOM: A LITTLE
CLIMB 3 TO 5 STEPS WITH RAILING: TOTAL
CLIMB 3 TO 5 STEPS WITH RAILING: A LITTLE
MOVING TO AND FROM BED TO CHAIR: A LITTLE
MOBILITY SCORE: 12
MOVING TO AND FROM BED TO CHAIR: A LITTLE
DAILY ACTIVITIY SCORE: 18
STANDING UP FROM CHAIR USING ARMS: A LITTLE
HELP NEEDED FOR BATHING: A LITTLE
MOBILITY SCORE: 20
STANDING UP FROM CHAIR USING ARMS: A LOT
DAILY ACTIVITIY SCORE: 24
MOVING FROM LYING ON BACK TO SITTING ON SIDE OF FLAT BED WITH BEDRAILS: A LOT
STANDING UP FROM CHAIR USING ARMS: A LITTLE
WALKING IN HOSPITAL ROOM: A LITTLE
TOILETING: A LITTLE
DAILY ACTIVITIY SCORE: 24
MOVING TO AND FROM BED TO CHAIR: A LITTLE
MOVING TO AND FROM BED TO CHAIR: A LOT
WALKING IN HOSPITAL ROOM: A LITTLE
CLIMB 3 TO 5 STEPS WITH RAILING: A LITTLE
MOBILITY SCORE: 20
PATIENT BASELINE BEDBOUND: NO
DAILY ACTIVITIY SCORE: 24
DRESSING REGULAR LOWER BODY CLOTHING: A LOT
CLIMB 3 TO 5 STEPS WITH RAILING: A LITTLE
TURNING FROM BACK TO SIDE WHILE IN FLAT BAD: A LOT
PERSONAL GROOMING: A LITTLE
DRESSING REGULAR UPPER BODY CLOTHING: A LITTLE
WALKING IN HOSPITAL ROOM: A LITTLE

## 2024-10-02 ASSESSMENT — PAIN - FUNCTIONAL ASSESSMENT
PAIN_FUNCTIONAL_ASSESSMENT: 0-10

## 2024-10-02 ASSESSMENT — PAIN SCALES - GENERAL
PAINLEVEL_OUTOF10: 0 - NO PAIN
PAINLEVEL_OUTOF10: 3

## 2024-10-02 ASSESSMENT — ACTIVITIES OF DAILY LIVING (ADL)
TOILETING: INDEPENDENT
DRESSING YOURSELF: INDEPENDENT
BATHING: INDEPENDENT
GROOMING: INDEPENDENT
FEEDING YOURSELF: INDEPENDENT
HEARING - LEFT EAR: HEARING AID
DRESSING YOURSELF: INDEPENDENT
ADEQUATE_TO_COMPLETE_ADL: YES
WALKS IN HOME: NEEDS ASSISTANCE
JUDGMENT_ADEQUATE_SAFELY_COMPLETE_DAILY_ACTIVITIES: YES
PATIENT'S MEMORY ADEQUATE TO SAFELY COMPLETE DAILY ACTIVITIES?: YES
ADEQUATE_TO_COMPLETE_ADL: YES
HEARING - RIGHT EAR: HEARING AID
JUDGMENT_ADEQUATE_SAFELY_COMPLETE_DAILY_ACTIVITIES: YES
LACK_OF_TRANSPORTATION: NO
PATIENT'S MEMORY ADEQUATE TO SAFELY COMPLETE DAILY ACTIVITIES?: YES
WALKS IN HOME: NEEDS ASSISTANCE
BATHING: INDEPENDENT
ASSISTIVE_DEVICE: DENTURES PARTIAL;EYEGLASSES;WALKER
TOILETING: INDEPENDENT
HEARING - RIGHT EAR: HEARING AID
GROOMING: INDEPENDENT
HEARING - LEFT EAR: HEARING AID
BATHING_ASSISTANCE: MINIMAL
FEEDING YOURSELF: INDEPENDENT

## 2024-10-02 ASSESSMENT — LIFESTYLE VARIABLES
AUDIT-C TOTAL SCORE: 0
PRESCIPTION_ABUSE_PAST_12_MONTHS: NO
HOW OFTEN DO YOU HAVE 6 OR MORE DRINKS ON ONE OCCASION: NEVER
SKIP TO QUESTIONS 9-10: 1
SUBSTANCE_ABUSE_PAST_12_MONTHS: NO
AUDIT-C TOTAL SCORE: 0
SKIP TO QUESTIONS 9-10: 1
HOW MANY STANDARD DRINKS CONTAINING ALCOHOL DO YOU HAVE ON A TYPICAL DAY: PATIENT DOES NOT DRINK
AUDIT-C TOTAL SCORE: 0
SUBSTANCE_ABUSE_PAST_12_MONTHS: NO
HOW OFTEN DO YOU HAVE A DRINK CONTAINING ALCOHOL: NEVER
PRESCIPTION_ABUSE_PAST_12_MONTHS: NO

## 2024-10-02 ASSESSMENT — PATIENT HEALTH QUESTIONNAIRE - PHQ9
2. FEELING DOWN, DEPRESSED OR HOPELESS: NOT AT ALL
1. LITTLE INTEREST OR PLEASURE IN DOING THINGS: NOT AT ALL
SUM OF ALL RESPONSES TO PHQ9 QUESTIONS 1 & 2: 0

## 2024-10-02 ASSESSMENT — PAIN DESCRIPTION - DESCRIPTORS: DESCRIPTORS: DULL

## 2024-10-02 NOTE — CARE PLAN
The patient's goals for the shift include rest    The clinical goals for the shift include safety-comfort      Problem: Diabetes  Goal: Achieve decreasing blood glucose levels by end of shift  Flowsheets (Taken 10/2/2024 0245)  Achieve decreasing blood glucose levels by end of shift: Med administration/monitoring of effect     Problem: Pain - Adult  Goal: Verbalizes/displays adequate comfort level or baseline comfort level  Flowsheets (Taken 10/2/2024 0245)  Verbalizes/displays adequate comfort level or baseline comfort level: Encourage patient to monitor pain and request assistance     Problem: Safety - Adult  Goal: Free from fall injury  Flowsheets (Taken 10/2/2024 0245)  Free from fall injury: Based on caregiver fall risk screen, instruct family/caregiver to ask for assistance with transferring infant if caregiver noted to have fall risk factors     Problem: Discharge Planning  Goal: Discharge to home or other facility with appropriate resources  Flowsheets (Taken 10/2/2024 0245)  Discharge to home or other facility with appropriate resources: Identify barriers to discharge with patient and caregiver     Problem: Chronic Conditions and Co-morbidities  Goal: Patient's chronic conditions and co-morbidity symptoms are monitored and maintained or improved  Flowsheets (Taken 10/2/2024 0245)  Care Plan - Patient's Chronic Conditions and Co-Morbidity Symptoms are Monitored and Maintained or Improved: Monitor and assess patient's chronic conditions and comorbid symptoms for stability, deterioration, or improvement     Problem: Skin  Goal: Decreased wound size/increased tissue granulation at next dressing change  Flowsheets (Taken 10/2/2024 0245)  Decreased wound size/increased tissue granulation at next dressing change: Promote sleep for wound healing  Note: Consult wound RN to assess wounds rt hand and face

## 2024-10-02 NOTE — PROGRESS NOTES
Physical Therapy    Physical Therapy Evaluation    Patient Name: Carmen Vinson  MRN: [id number]  Today's Date: 10/2/2024   Time Calculation  Start Time: 1050  Stop Time: 1118  Time Calculation (min): 28 min  318/318-A    Assessment/Plan   PT Assessment  PT Assessment Results: Decreased strength, Decreased endurance, Impaired balance, Decreased mobility, Decreased safety awareness  Rehab Prognosis: Good  Barriers to Discharge: Positive Orthostatic BP/ dizziness  Evaluation/Treatment Tolerance:  (Limited due to dizziness)  Medical Staff Made Aware: Yes  End of Session Communication:  (Nursing student- told about patient chair being unlocked and unalarmed and informed of therapists now leaving patient up in chair with alarm on and chair locked)  End of Session Patient Position: Up in chair, Alarm on  IP OR SWING BED PT PLAN  Inpatient or Swing Bed: Inpatient  PT Plan  Treatment/Interventions: Bed mobility, Transfer training, Gait training, Stair training, Balance training, Neuromuscular re-education, Strengthening, Endurance training, Therapeutic exercise, Therapeutic activity, Home exercise program, Postural re-education  PT Plan: Ongoing PT  PT Frequency: 6 times per week  PT Discharge Recommendations: Moderate intensity level of continued care (pending progress)  PT Recommended Transfer Status: Assist x1, Assistive device  PT - OK to Discharge: Yes (PT eval complete and POC in place, defer discharge to physician, patient orthostatic hypotension needs addressed for safe mobility)    Subjective     Current Problem:  1. Head injury, initial encounter        2. Abnormal CT of the head        3. Dizziness  Transthoracic Echo (TTE) Complete    Vascular US carotid artery duplex bilateral    Transthoracic Echo (TTE) Complete    Vascular US carotid artery duplex bilateral      4. Facial laceration, initial encounter        5. Postural dizziness with near syncope  Transthoracic Echo (TTE) Complete    Transthoracic Echo (TTE)  Complete        Patient Active Problem List   Diagnosis    Anxiety, generalized    Arthritis of facet joint of lumbar spine    Arthritis of knee, right    BPH (benign prostatic hyperplasia)    CAD (coronary artery disease)    Chronic prostatitis    Colon cancer (Multi)    Diabetes mellitus (Multi)    Elevated PSA measurement    Enlarged prostate    Erectile dysfunction    GERD (gastroesophageal reflux disease)    HTN (hypertension), benign    Hyperlipidemia    Hypothyroid    Kidney stone    Lumbosacral spondylosis    Multilevel degenerative disc disease    Nocturia    Primary insomnia    Pruritic dermatitis    Right knee pain    Urinary retention    Stage 3b chronic kidney disease (Multi)    Head injury, initial encounter       General Visit Information:  General  Reason for Referral: Admitted 10/1 after a mechanical fall while ambulating at a retail store. Patient hit head and injured R hand. MRI showed lateral L occipital lobe area of subacute ischemia with punctate petechial hemorrhage along cortex.  Referred By: Matias Johnson  Past Medical History Relevant to Rehab: Colon cancer, HTN, anxiety, BPH, CAD, DM, HLD, hypothyroidism, LE edema, kidney stones, insomnia, renal cysts hiatal hernia  Family/Caregiver Present: Yes (Wife, Son)  Co-Treatment: OT  Co-Treatment Reason: to maximize patient safety and mobility while focusing on discipline specific goals.  Prior to Session Communication: Bedside nurse  Patient Position Received: Up in chair, Alarm off, not on at start of session  General Comment: Patient is [place] and hears better out of L ear per family. Patient is friendly and agreeable to participate. Patient reports dizziness upon standing. (BP drops this session with sitting to standing this session)    Home Living:  Home Living  Type of Home: Condo  Lives With: Spouse  Home Adaptive Equipment:  (Rollator, cane)  Home Layout: One level  Home Access:  (Threshold Entrance)  Bathroom Shower/Tub: Walk-in tub  Bathroom  Equipment: Grab bars in shower, Shower chair with back    Prior Level of Function:  Prior Function Per Pt/Caregiver Report  Prior Function Comments: Patient reports modified independent with ambulation using a rollator at baseline. Patient reports independence with bed mobility ,transfers, bathing, dressing and driving. Patient and his wife share the cooking, cleaning and laundry. Patient has groceries delivered. (Patient reports last fall prior to this fall was on July 1st.)    Precautions:  Precautions  Medical Precautions: Fall precautions (Orthostatic positive- dizziness)    Vital Signs:     Objective     Pain:  Pain Assessment  Pain Assessment: 0-10  0-10 (Numeric) Pain Score: 0 - No pain    Cognition:  Cognition  Overall Cognitive Status: Within Functional Limits  Orientation Level: Oriented X4    General Assessments:      Activity Tolerance  Endurance:  (Limited due to dizziness/orthostatic hypotension)  Sensation  Sensation Comment: Patient denies numbness and tingling.  Strength  Strength Comments: B LEs grossly: 4/5 except R hip flexion: 3+/5     Coordination  Finger to Nose: Intact  Alternating Toe Taps: Intact  Heel to [place]: Intact  Finger to Target: Intact  Coordination Comment: Opposition: Intact  Postural Control  Posture Comment: Flexed posture  Static Sitting Balance  Static Sitting-Comment/Number of Minutes: Fair+  Dynamic Sitting Balance  Dynamic Sitting-Comments: Fair  Static Standing Balance  Static Standing-Comment/Number of Minutes: Fair (Posterior lean and LOB in standing, braes calves against chair for stability, on 1st stand fell backwards into chair)  Dynamic Standing Balance  Dynamic Standing-Comments: Fair-    Functional Assessments:     Bed Mobility  Bed Mobility: No  Transfers  Transfer: Yes  Transfer 1  Technique 1: Sit to stand, Stand to sit  Transfer Device 1: Walker, Gait belt  Transfer Level of Assistance 1: Moderate assistance  Trials/Comments 1: sit <> stand x 2, from chair, 1st  trial with Mod A x 1 attempted to lean forward excessively and pull on bed rail and then fell backwards back into chair due to posterior loss of balance, patient braces calves on chair for stability and prevent loss of balance (verbal cues for proper hand placement, safe approach to chair/not abandoning walker and controlled descent)  Ambulation/Gait Training  Ambulation/Gait Training Performed: Yes  Ambulation/Gait Training 1  Surface 1: Level tile  Device 1: Rolling walker  Gait Support Devices: Gait belt  Assistance 1: Minimum assistance  Quality of Gait 1:  (keeps walker out too far in front of him, flexed posture, decreased heel toe pattern, decreased foot clearance)  Comments/Distance (ft) 1: 125 ft (cues for staying within frame of walker, safe turns, upright posture, increasing foot clearance)  Stairs  Stairs: No       Outcome Measures:     [facility] Basic Mobility  Turning from your back to your side while in a flat bed without using bedrails: A lot  Moving from lying on your back to sitting on the side of a flat bed without using bedrails: A lot  Moving to and from bed to chair (including a wheelchair): A lot  Standing up from a chair using your arms (e.g. wheelchair or bedside chair): A lot  To walk in hospital room: A little  Climbing 3-5 steps with railing: Total  Basic Mobility - Total Score: 12                                                             Goals:  Encounter Problems       Encounter Problems (Active)       Balance       Patient will have improved balance to Good- with static sitting, Fair+ with dynamic sitting, Fair+ with static standing and Fair with dynamic standing to decrease fall risk (Not Progressing)       Start:  10/02/24    Expected End:  10/16/24               Mobility       Patient will negotiate 1 threshold step with no handrail, CGA, no loss of balance, to practice for safe home entry  (Not Progressing)       Start:  10/02/24    Expected End:  10/16/24            Patient will  safely ambulate 250 ft with wheeled walker, SBA, no loss of balance, upright posture, safe 2 wheeled walker use, appropriate foot clearance, heel toe pattern. (Not Progressing)       Start:  10/02/24    Expected End:  10/16/24            Patient will tolerate dynamic functional activity to increase endurance to 30 minutes. (Not Progressing)       Start:  10/02/24    Expected End:  10/16/24            Patient will increase strength for improved transfer/gait stability to B Les and core. (Not Progressing)       Start:  10/02/24    Expected End:  10/16/24               PT Transfers       Patient will safely transfer supine to/from sit with Min A x 1 with HOB flat. (Not Progressing)       Start:  10/02/24    Expected End:  10/16/24            Patient will safely transfer sit to/from stand with CGA using safe technique from various surfaces/heights. (Not Progressing)       Start:  10/02/24    Expected End:  10/16/24               Pain - Adult            Education Documentation  Mobility Training, taught by Shila Wilson, PT at 10/2/2024  1:46 PM.  Learner: Patient  Readiness: Acceptance  Method: Explanation  Response: Verbalizes Understanding, Needs Reinforcement  Comment: Transfers- proper hand placement, safe approach to chair, controlled descentAmbulation- safe walker use, staying within frame of walker, safe turns, upright posture, increasing foot clearance, heel toe    Education Comments  No comments found.

## 2024-10-02 NOTE — PROGRESS NOTES
Carmen Rose is a 89 y.o. male on day 0 of admission presenting with Head injury, initial encounter.      Subjective   No events over night    Reports no headache,   Feels unsteady  No c/o syncope,        Objective     Last Recorded Vitals  /64 (BP Location: Right arm, Patient Position: Sitting)   Pulse 60   Temp 36.4 °C (97.5 °F) (Temporal)   Resp 16   Wt 77 kg (169 lb 12.1 oz)   SpO2 98%   Intake/Output last 3 Shifts:    Intake/Output Summary (Last 24 hours) at 10/2/2024 1726  Last data filed at 10/2/2024 1200  Gross per 24 hour   Intake 120 ml   Output --   Net 120 ml       Admission Weight  Weight: 73.9 kg (163 lb) (10/01/24 1556)    Daily Weight  10/02/24 : 77 kg (169 lb 12.1 oz)    Image Results  Vascular US carotid artery duplex bilateral  Preliminary Cardiology Report                  [place] [address]  Phone [phone], Fax [phone]           Preliminary Vascular Lab Report     [facility] US CAROTID ARTERY DUPLEX BILATERAL       Patient Name:    CARMEN Cole Physician: [id number] Lisa ROSE MD  Study Date:      10/2/2024    Ordering Provider: [id number] ALEX TOSCANO  MRN/PID:         [id number]     Fellow:  Accession#:      [id number] Technologist:      Harrison Alexander RVT  YOB: 1926    Technologist 2:  Gender:          M            Encounter#:        [id number]  Admission        Inpatient    Location           [facility]  Status:                       Performed:       Diagnosis/ICD: Other specified symptoms and signs involving the circulatory and                 respiratory systems-R09.89; Syncope and collapse-R55  CPT Codes:     81970 Cerebrovascular Carotid Duplex scan complete       PRELIMINARY CONCLUSIONS:     Right Carotid: Findings are consistent with less than 50% stenosis of the right proximal internal carotid artery. Laminar flow seen by  color Doppler. There are elevated velocities in the right ECA that are suggestive of disease. No evidence of hemodynamically significant stenosis of the right common carotid artery. The right vertebral artery is patent with antegrade flow. No evidence of hemodynamically significant stenosis in the right subclavian artery.  Left Carotid: Findings are consistent with less than 50% stenosis of the left proximal internal carotid artery. Laminar flow seen by color Doppler. There are elevated velocities in the left ECA that are suggestive of disease. No evidence of hemodynamically significant stenosis of the left common carotid artery. The left vertebral artery stenosis. There are elevated velocities in the left subclavian artery that are suggestive of disease.     Imaging & Doppler Findings:  Right Plaque Morph: The proximal right internal carotid artery demonstrates irregular and heterogenous plaque. The proximal right external carotid artery demonstrates calcified and shadowing plaque. The distal right common carotid artery demonstrates irregular and heterogenous plaque. The right carotid bulb demonstrates irregular and heterogenous plaque.  Left Plaque Morph: The proximal left internal carotid artery demonstrates calcified and shadowing plaque. The proximal left external carotid artery demonstrates calcified and shadowing plaque. The proximal left common carotid artery demonstrates heterogenous plaque. The mid left common carotid artery demonstrates heterogenous plaque. The distal left common carotid artery demonstrates heterogenous plaque.      Right                        Left    PSV      EDV                PSV      EDV  132 cm/s           CCA P    118 cm/s  140 cm/s           CCA D    141 cm/s  155 cm/s 20 cm/s   ICA P    162 cm/s 16 cm/s  131 cm/s 21 cm/s   ICA D    156 cm/s 31 cm/s  292 cm/s            ECA     174 cm/s  65 cm/s  11 cm/s Vertebral  159 cm/s 13 cm/s  160 cm/s         Subclavian 354 cm/s                      Right Left  ICA/CCA Ratio  1.1  1.1          VASCULAR PRELIMINARY REPORT  completed by Harrison Alexander RVT on 10/2/2024 at 4:35:51 PM       ** Final **  ECG 12 lead  Sinus rhythm with 1st degree AV block  Left ventricular hypertrophy with repolarization abnormality ( R in aVL )  Abnormal ECG  No previous ECGs available  See ED provider note for full interpretation and clinical correlation  Confirmed by Li Lott ([id number]) on 10/2/2024 10:11:04 AM  Transthoracic Echo (TTE) Complete               [place] [address]  Phone [phone], Fax [phone]    TRANSTHORACIC ECHOCARDIOGRAM REPORT    Patient Name:      REBA Cole Physician:    [id number] Adria Patrick MD  Study Date:        10/2/2024             Ordering Provider:    [id number] ALEX TOSCANO  MRN/PID:           [id number]              Fellow:  Accession#:        [id number]          Nurse:                Lupe Mann RN  Date of Birth/Age: 7/25/1926 / 89 years  Sonographer:          YUE Beatty RVT  Gender:            M                     Additional Staff:  Height:            162.56 cm             Admit Date:           10/1/2024  Weight:            76.66 kg              Admission Status:     Inpatient -                                                                 Routine  BSA / BMI:         1.82 m2 / 29.01 kg/m2 Department Location:  [address]  Blood Pressure: 132 /70 mmHg    Study Type:    TRANSTHORACIC ECHO (TTE) COMPLETE  Diagnosis/ICD: Dizziness and giddiness-R42  Indication:    Dizziness,Near Syncope  CPT Codes:     Echo Complete w Full Doppler-90613    Patient History:  Pertinent History: No previous echo.    Study Detail: The following Echo studies were performed: 2D,  M-Mode, Doppler and                color flow. Definity used as a contrast agent for endocardial                border definition. Total contrast used for this procedure was 2 mL                via IV push. A bubble study was not performed. The patient was                awake.       PHYSICIAN INTERPRETATION:  Left Ventricle: Left ventricular ejection fraction is normal, by visual estimate at 55-60%. There are no regional wall motion abnormalities. The left ventricular cavity size is normal. There is moderate asymmetric left ventricular hypertrophy involving the basal wall. Spectral Doppler shows a pseudonormal pattern of left ventricular diastolic filling.  Left Atrium: The left atrium is normal in size. A bubble study using agitated saline was not performed.  Right Ventricle: The right ventricle is normal in size. There is reduced right ventricular systolic function.  Right Atrium: The right atrium is normal in size.  Aortic Valve: The aortic valve is trileaflet. The aortic valve dimensionless index is 0.89. There is no evidence of aortic valve regurgitation. The peak instantaneous gradient of the aortic valve is 11.0 mmHg. The mean gradient of the aortic valve is 6.0 mmHg.  Mitral Valve: The mitral valve is abnormal. There is mild mitral annular calcification. There is mild mitral valve regurgitation.  Tricuspid Valve: The tricuspid valve is structurally normal. There is moderate tricuspid regurgitation. RV-RA gradient 46 mmHg.  Pulmonic Valve: The pulmonic valve is not well visualized. There is mild pulmonic valve regurgitation.  Pericardium: No pericardial effusion noted.  Aorta: The aortic root is normal.  Pulmonary Artery: The Doppler estimated pulmonary artery diastolic pressure is 9.2 mmHg.       CONCLUSIONS:   1. Left ventricular ejection fraction is normal, by visual estimate at 55-60%.   2. Spectral Doppler shows a pseudonormal pattern of left ventricular diastolic filling.   3. There is moderate  asymmetric left ventricular hypertrophy.   4. There is reduced right ventricular systolic function.   5. Moderate tricuspid regurgitation.   6. Elevated pulmonary artery systolic pressure suggestive of pulmonary hypertension.    QUANTITATIVE DATA SUMMARY:     2D MEASUREMENTS:           Normal Ranges:  Ao Root d:       2.70 cm   (2.0-3.7cm)  LAs:             3.30 cm   (2.7-4.0cm)  IVSd:            1.75 cm   (0.6-1.1cm)  LVPWd:           1.24 cm   (0.6-1.1cm)  LVIDd:           3.20 cm   (3.9-5.9cm)  LVIDs:           1.98 cm  LV Mass Index:   93.7 g/m2  LV % FS          38.1 %       LA VOLUME:                    Normal Ranges:  LA Vol A4C:        40.3 ml    (22+/-6mL/m2)  LA Vol A2C:        37.8 ml  LA Vol BP:         40.1 ml  LA Vol Index A4C:  22.1ml/m2  LA Vol Index A2C:  20.8 ml/m2  LA Vol Index BP:   22.0 ml/m2  LA Area A4C:       15.6 cm2  LA Area A2C:       14.7 cm2  LA Major Axis A4C: 5.1 cm  LA Major Axis A2C: 4.9 cm  LA Volume Index:   20.2 ml/m2  LA Vol A4C:        39.9 ml  LA Vol A2C:        36.7 ml  LA Vol Index BSA:  21.0 ml/m2       M-MODE MEASUREMENTS:         Normal Ranges:  AoV Exc:             1.10 cm (1.5-2.5cm)       AORTA MEASUREMENTS:         Normal Ranges:  AoV Exc:            1.10 cm (1.5-2.5cm)       LV SYSTOLIC FUNCTION BY 2D PLANIMETRY (MOD):                       Normal Ranges:  EF-A4C View:    58 % (>=55%)  EF-A2C View:    51 %  EF-Biplane:     54 %  EF-Visual:      58 %  LV EF Reported: 58 %       LV DIASTOLIC FUNCTION:           Normal Ranges:  MV Peak E:             1.40 m/s  (0.7-1.2 m/s)  MV Peak A:             1.24 m/s  (0.42-0.7 m/s)  E/A Ratio:             1.13      (1.0-2.2)  MV e'                  0.070 m/s (>8.0)  MV lateral e'          0.07 m/s  MV medial e'           0.07 m/s  E/e' Ratio:            19.96     (<8.0)       MITRAL VALVE:          Normal Ranges:  MV DT:        296 msec (150-240msec)       MITRAL INSUFFICIENCY:             Normal Ranges:  MR Vmax:               448.00 cm/s       AORTIC VALVE:                      Normal Ranges:  AoV Vmax:                1.66 m/s  (<=1.7m/s)  AoV Peak P.0 mmHg (<20mmHg)  AoV Mean P.0 mmHg  (1.7-11.5mmHg)  LVOT Max Ernesto:            1.49 m/s  (<=1.1m/s)  AoV VTI:                 42.40 cm  (18-25cm)  LVOT VTI:                37.60 cm  LVOT Diameter:           1.70 cm   (1.8-2.4cm)  AoV Area, VTI:           2.01 cm2  (2.5-5.5cm2)  AoV Area,Vmax:           2.04 cm2  (2.5-4.5cm2)  AoV Dimensionless Index: 0.89       RIGHT VENTRICLE:  RV Basal 3.47 cm  RV Mid   2.14 cm  RV Major 5.2 cm  TAPSE:   11.7 mm       TRICUSPID VALVE/RVSP:          Normal Ranges:  Peak TR Velocity:     3.39 m/s  RV Syst Pressure:     49 mmHg  (< 30mmHg)       PULMONIC VALVE:          Normal Ranges:  PV Accel Time:  102 msec (>120ms)  PV Max Ernesto:     1.3 m/s  (0.6-0.9m/s)  PV Max P.1 mmHg  PIEDV:          1.25 m/s  PADP:           9.2 mmHg       [id number] Adria Patrick MD  Electronically signed on 10/2/2024 at 9:48:50 AM       ** Final **  MR brain wo IV contrast, MR angio head wo IV contrast  Narrative: Interpreted By:  Marcelo Reyes,   STUDY:  MR BRAIN WO IV CONTRAST; MR ANGIO HEAD WO IV CONTRAST;  10/2/2024  8:00 am      INDICATION:  Signs/Symptoms:rule out CVA.  Stroke protocol.          Signs/Symptoms:rule out CVA.  Fell, striking head.      COMPARISON:  10/01/2024 brain CT      ACCESSION NUMBER(S):  [id number]; [id number]      ORDERING CLINICIAN:  ALEX TOSCANO      TECHNIQUE:  Axial T2, FLAIR, DWI, gradient echo T2 and  sagittal and coronal T1  weighted images of brain were acquired.      Time-of-flight MRA of the head was performed. The images were  reviewed as source images and maximum intensity projections.      FINDINGS:  Brain:      CSF Spaces: The ventricles and sulci are prominent but normal for the  patient's age, unchanged.      Parenchyma: The left occipital lucency noted laterally on the CT has  faint restricted  diffusion with hyperintensity on the T2/FLAIR  images. A small focus of gradient echo hypointensity is also present  along the cortex suggestive of petechial hemorrhage.      The white matter and stephanie have a few focal nonspecific FLAIR  hyperintensities.      Otherwise no mass or hemorrhage is noted.      Paranasal Sinuses and Mastoids: Visualized paranasal sinuses and  mastoid air cells are unremarkable.      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 There is expected flow signal in bilateral  intracranial internal carotid arteries, bilateral carotid terminals,  bilateral proximal anterior and middle cerebral arteries.      Posterior circulation:    The right posterior cerebral artery has  multiple moderate to severe stenoses in the P1 and more distal P3  segments. The intradural segment of the right vertebral artery has  mild less than 40% stenosis. No basilar artery stenoses or stenoses  of the left posterior cerebral artery are noted.      Impression: MRI Brain:      1. The lateral left occipital lobe has an area subacute ischemia with  punctate petechial hemorrhage along the cortex corresponding to the  lucency on the C T.      2. The parenchymal hyperintensities elsewhere may be secondary to  chronic microvascular ischemic changes among others.      MRA:      The right posterior cerebral artery has multiple foci moderate to  severe stenoses. No stenoses of the left posterior cerebral artery  are noted.      MACRO:  Critical Finding:  See findings. Notification was initiated on  10/2/2024 at 9:32 am by  Marcelo Reyes.  (**-OCF-**)      Signed by: Marcelo Reyes 10/2/2024 9:32 AM  Dictation workstation:   [id number]    Vitals:    10/02/24 1500   BP: 154/64   Pulse: 60   Resp: 16   Temp: 36.4 °C (97.5 °F)   SpO2: 98%          Physical Exam  Constitutional:       Comments: Elderly gentleman, reduced hearing, oriented x 3,    HENT:      Head: Normocephalic.      Nose: Nose normal.   Eyes:      Extraocular Movements: Extraocular movements intact.      Pupils: Pupils are equal, round, and reactive to light.   Cardiovascular:      Rate and Rhythm: Normal rate and regular rhythm.      Heart sounds: Normal heart sounds. No murmur heard.  Pulmonary:      Effort: No respiratory distress.      Breath sounds: Normal breath sounds. No wheezing.   Abdominal:      General: There is no distension.      Palpations: Abdomen is soft.      Tenderness: There is no abdominal tenderness.   Musculoskeletal:         General: Normal range of motion.      Cervical back: Normal range of motion.   Skin:     General: Skin is warm.      Comments: Bruising noted around the right eye,    Neurological:      General: No focal deficit present.      Mental Status: He is alert and oriented to person, place, and time. Mental status is at baseline.   Psychiatric:         Mood and Affect: Mood normal.         Relevant Results  Scheduled medications  aspirin, 81 mg, oral, Daily  atorvastatin, 40 mg, oral, Nightly  finasteride, 5 mg, oral, Daily  influenza, 0.5 mL, intramuscular, During hospitalization  insulin lispro, 0-10 Units, subcutaneous, Before meals & nightly  isosorbide mononitrate ER, 30 mg, oral, Daily  levothyroxine, 137 mcg, oral, Daily  lisinopril, 5 mg, oral, Daily  perflutren protein A microsphere, 0.5 mL, intravenous, Once in imaging  sulfur hexafluoride microsphr, 2 mL, intravenous, Once in imaging      Continuous medications     PRN medications  PRN medications: acetaminophen **OR** acetaminophen **OR** acetaminophen, dextrose, dextrose, glucagon, glucagon, hydrALAZINE **FOLLOWED BY** [START ON 10/4/2024] hydrALAZINE, labetaloL, lubricating eye drops, magnesium hydroxide, ondansetron ODT **OR** ondansetron, oxygen    Results for orders placed or  performed during the hospital encounter of 10/01/24 (from the past 24 hour(s))   CBC and Auto Differential   Result Value Ref Range    WBC 6.8 4.4 - 11.3 x10*3/uL    nRBC 0.0 0.0 - 0.0 /100 WBCs    RBC 3.34 (L) 4.50 - 5.90 x10*6/uL    Hemoglobin 11.1 (L) 13.5 - 17.5 g/dL    Hematocrit 32.1 (L) 41.0 - 52.0 %    MCV 96 80 - 100 fL    MCH 33.2 26.0 - 34.0 pg    MCHC 34.6 32.0 - 36.0 g/dL    RDW 11.6 11.5 - 14.5 %    Platelets 213 150 - 450 x10*3/uL    Neutrophils % 68.4 40.0 - 80.0 %    Immature Granulocytes %, Automated 0.4 0.0 - 0.9 %    Lymphocytes % 16.9 13.0 - 44.0 %    Monocytes % 13.0 2.0 - 10.0 %    Eosinophils % 1.0 0.0 - 6.0 %    Basophils % 0.3 0.0 - 2.0 %    Neutrophils Absolute 4.64 1.60 - 5.50 x10*3/uL    Immature Granulocytes Absolute, Automated 0.03 0.00 - 0.50 x10*3/uL    Lymphocytes Absolute 1.15 0.80 - 3.00 x10*3/uL    Monocytes Absolute 0.88 (H) 0.05 - 0.80 x10*3/uL    Eosinophils Absolute 0.07 0.00 - 0.40 x10*3/uL    Basophils Absolute 0.02 0.00 - 0.10 x10*3/uL   Basic metabolic panel   Result Value Ref Range    Glucose 225 (H) 74 - 99 mg/dL    Sodium 139 136 - 145 mmol/L    Potassium 3.8 3.5 - 5.3 mmol/L    Chloride 107 98 - 107 mmol/L    Bicarbonate 24 21 - 32 mmol/L    Anion Gap 12 10 - 20 mmol/L    Urea Nitrogen 25 (H) 6 - 23 mg/dL    Creatinine 1.32 (H) 0.50 - 1.30 mg/dL    eGFR 49 (L) >60 mL/min/1.73m*2    Calcium 9.2 8.6 - 10.3 mg/dL   Type And Screen   Result Value Ref Range    ABO TYPE AB     Rh TYPE POS     ANTIBODY SCREEN NEG    ECG 12 lead   Result Value Ref Range    Ventricular Rate 68 BPM    Atrial Rate 68 BPM    VA Interval 226 ms    QRS Duration 88 ms    QT Interval 418 ms    QTC Calculation(Bazett) 444 ms    P Axis 20 degrees    R Axis 2 degrees    T Axis 41 degrees    QRS Count 11 beats    Q Onset 217 ms    P Onset 104 ms    P Offset 140 ms    T Offset 426 ms    QTC Fredericia 436 ms   Transthoracic Echo (TTE) Complete   Result Value Ref Range    LVOT diam 1.70 cm    MV E/A ratio  1.13     AV pk marin 1.66 m/s    AV mn grad 6.0 mmHg    LV Biplane EF 54 %    Tricuspid annular plane systolic excursion 1.2 cm    LA vol index A/L 22.0 ml/m2    LV EF 58 %    LVIDd 3.20 cm    RVSP 49.0 mmHg    Aortic Valve Area by Continuity of Peak Velocity 2.04 cm2    Aortic Valve Area by Continuity of VTI 2.01 cm2    AV pk grad 11.0 mmHg    LV A4C EF 57.5    Lipid Panel   Result Value Ref Range    Cholesterol 102 0 - 199 mg/dL    HDL-Cholesterol 46.0 mg/dL    Cholesterol/HDL Ratio 2.2     LDL Calculated 35 <=99 mg/dL    VLDL 21 0 - 40 mg/dL    Triglycerides 107 0 - 149 mg/dL    Non HDL Cholesterol 56 0 - 149 mg/dL   Lavender Top   Result Value Ref Range    Extra Tube Hold for add-ons.    SST TOP   Result Value Ref Range    Extra Tube Hold for add-ons.    POCT GLUCOSE   Result Value Ref Range    POCT Glucose 145 (H) 74 - 99 mg/dL   POCT GLUCOSE   Result Value Ref Range    POCT Glucose 144 (H) 74 - 99 mg/dL         Assessment/Plan         89-year-old gentleman with PMH significant for hypertension, anxiety, BPH, CAD, diabetes mellitus, hyperlipidemia, hypothyroidism, lower extremities edema on Lasix, kidney stones, insomnia, renal cysts, hiatal hernia, who was brought by EMS to the [facility] c/o mechanical fall while walking at a retail store.  On presentation, patient was found to have a hypertensive urgency. CT of the head showed a focal loss of gray-white differentiation and hypoattenuation of the left occipital lobe that could reflect an acute infarct.   CT of the upper chest showed a trace right apical pneumothorax.MRI showed suspected subacute infarct in left occipital lobe with punctate hemorrhage.     Subacute left occipital infarct::  Case discussed with Neurologist at [facility]  Images reviewed by Neurology  Possible sub acute ischemia  Continue Aspirin 81 mg daily  Lipitor 40 mg daily    Mechanical Fall:   Monitor Orthostatics  PT, OT    H/o Hypertension:  On Imdur and Lisinopril 5mg  daily  Monitor    Dispo:  As per PT, OT.  Patient prefers home with HHC. Aim for home tomorrow.         Nadir Espana MD

## 2024-10-02 NOTE — PROGRESS NOTES
"   10/02/24 1215   Discharge Planning   Living Arrangements Spouse/significant other   Support Systems Spouse/significant other;Children;Family members;Friends/neighbors   Assistance Needed minimal   Type of Residence Private residence   Do you have animals or pets at home? No   Who is requesting discharge planning? Patient   Home or Post Acute Services None   Expected Discharge Disposition Home   Does the patient need discharge transport arranged? No   Financial Resource Strain   How hard is it for you to pay for the very basics like food, housing, medical care, and heating? Not hard   Housing Stability   In the last 12 months, was there a time when you were not able to pay the mortgage or rent on time? N   In the past 12 months, how many times have you moved where you were living? 0   At any time in the past 12 months, were you homeless or living in a shelter (including now)? N   Transportation Needs   In the past 12 months, has lack of transportation kept you from medical appointments or from getting medications? no   In the past 12 months, has lack of transportation kept you from meetings, work, or from getting things needed for daily living? No     1215 Met with pt  and wife Becky and son Lance at the bedside and verified address, phone number and emergency contact information. Lance can be reached at [phone]. PCP is Dr Montesinos last seen in September and preferred pharmacy is [facility], denies issues obtaining or affording medications and takes as ordered. Pt is independent and still drives, lives at home with his with and feels safe.  Pt wants to go home and have [facility] for PT. When the doctor was in and talked to him and talked about a facility they were all under the impression that he meant out patient and he would drive there daily not stay there. Once I explained that and asked which facility they would like to go to they stated \"none, I want to stay home\". He wants and is willing " to do therapy so he will do out pt therapy or HHC with . [facility] 18/12. ADOD 24-48 hrs. CT to follow. Crystal Alfaro BSN/RN-TCC

## 2024-10-02 NOTE — PROGRESS NOTES
Occupational Therapy    Evaluation    Patient Name: Carmen Vinson  MRN: [id number]  Today's Date: 10/2/2024  Time Calculation  Start Time: 1051  Stop Time: 1118  Time Calculation (min): 27 min  318/318-A    Assessment  IP OT Assessment  OT Assessment: pt with c/o dizziness which affects his balance during ADLs.  pt needing increased assist compared to PLOF.  Recommend OT services in order to ensure safe transition home.  Prognosis: Good  Barriers to Discharge: None  Evaluation/Treatment Tolerance: Patient tolerated treatment well, Other (Comment) (limited by dizziness)  Medical Staff Made Aware: Yes  End of Session Communication:  (Nursing student- told about patient chair being unlocked and unalarmed and informed of therapists now leaving patient up in chair with alarm on and chair locked)  End of Session Patient Position: Up in chair, Alarm on    Plan:  Treatment Interventions: ADL retraining, Visual perceptual retraining, Neuromuscular reeducation  OT Frequency: 3 times per week  OT Discharge Recommendations: Low intensity level of continued care, Moderate intensity level of continued care  OT Recommended Transfer Status: Assist of 1  OT - OK to Discharge: Yes (once medically stable)    Subjective     Current Problem:  1. Head injury, initial encounter        2. Abnormal CT of the head        3. Dizziness  Transthoracic Echo (TTE) Complete    Vascular US carotid artery duplex bilateral    Transthoracic Echo (TTE) Complete    Vascular US carotid artery duplex bilateral      4. Facial laceration, initial encounter        5. Postural dizziness with near syncope  Transthoracic Echo (TTE) Complete    Transthoracic Echo (TTE) Complete          General:  General  Reason for Referral: Admitted 10/1 after a mechanical fall while ambulating at a retail store. Patient hit head and injured R hand. MRI showed lateral L occipital lobe area of subacute ischemia with punctate petechial hemorrhage along cortex.  Referred By: Matias  Elizabeth  Past Medical History Relevant to Rehab: Colon cancer, HTN, anxiety, BPH, CAD, DM, HLD, hypothyroidism, LE edema, kidney stones, insomnia, renal cysts hiatal hernia  Family/Caregiver Present: Yes (wife, son)  Co-Treatment: PT  Co-Treatment Reason: to maximize patient safety and mobility while focusing on discipline specific goals.  Prior to Session Communication: Bedside nurse  Patient Position Received: Up in chair, Alarm off, not on at start of session  General Comment: Patient is [place] and hears better out of L ear per family. Patient is friendly and agreeable to participate. Patient reports dizziness upon standing.    Precautions:  Medical Precautions: Fall precautions, Other (comment) (orthostatic positive)    Vital Signs:   Pt with high BP upon arrival, nurse aware and okay to proceed with session.  Positive orthostatics with c/o dizziness    Pain:  Pain Assessment  Pain Assessment: 0-10  0-10 (Numeric) Pain Score: 0 - No pain    Objective     Cognition:  Overall Cognitive Status: Within Functional Limits  Orientation Level: Oriented X4             Home Living:  Type of Home: Condo  Lives With: Spouse  Home Adaptive Equipment:  (rollator cane)  Home Layout: One level  Home Access:  (threshold)  Bathroom Shower/Tub: Walk-in tub  Bathroom Equipment: Grab bars in shower, Shower chair with back     Prior Function:  Prior Function Comments: Patient reports modified independent with ambulation using a rollator at baseline. Patient reports independence with bed mobility ,transfers, bathing, dressing and driving. Patient and his wife share the cooking, cleaning and laundry. Patient has groceries delivered.    ADL:  Eating Assistance: Independent  Grooming Assistance: Stand by  Bathing Assistance: Minimal  UE Dressing Assistance: Stand by  LE Dressing Assistance: Minimal (min/mod)  Toileting Assistance with Device: Minimal (CGA)    Activity Tolerance:  Endurance:  (Limited due to dizziness/orthostatic  hypotension)    Bed Mobility/Transfers:   Bed Mobility  Bed Mobility: No  Transfers  Transfer: Yes  Transfer 1  Technique 1: Sit to stand, Stand to sit  Transfer Device 1: Walker, Gait belt  Transfer Level of Assistance 1: Moderate assistance  Trials/Comments 1: sit <> stand x 2, from chair, 1st trial with Mod A x 1 attempted to lean forward excessively and pull on bed rail and then fell backwards back into chair due to posterior loss of balance, patient braces calves on chair for stability and prevent loss of balance  verbal cues for proper hand placement, safe approach to chair/not abandoning walker and controlled descent    Ambulation/Gait Training:  Functional Mobility  Functional Mobility Performed: Yes  Functional Mobility 1  Surface 1: Level tile  Device 1: Rolling walker  Functional Mobility Support Devices: Gait belt  Assistance 1: Contact guard, Minimum assistance        Vision: Vision - Basic Assessment  Current Vision: No visual deficits (no c/o vision changes)      Sensation:  Light Touch: No apparent deficits  Sensation Comment: Patient denies numbness and tingling.    Strength:  Strength Comments: BUE WNL    Coordination:  Movements are Fluid and Coordinated: Yes  Finger to Nose: Intact  Finger to Target: Intact       Outcome Measures: [facility] Daily Activity  Putting on and taking off regular lower body clothing: A lot  Bathing (including washing, rinsing, drying): A little  Putting on and taking off regular upper body clothing: A little  Toileting, which includes using toilet, bedpan or urinal: A little  Taking care of personal grooming such as brushing teeth: A little  Eating Meals: None  Daily Activity - Total Score: 18                       EDUCATION:     Education Documentation  Body Mechanics, taught by Neida Velazquez OT at 10/2/2024  1:51 PM.  Learner: Patient  Readiness: Acceptance  Method: Explanation, Demonstration  Response: Demonstrated Understanding, Verbalizes Understanding, Needs  Reinforcement  Comment: safety during ADL    Precautions, taught by Neida Velazquez OT at 10/2/2024  1:51 PM.  Learner: Patient  Readiness: Acceptance  Method: Explanation, Demonstration  Response: Demonstrated Understanding, Verbalizes Understanding, Needs Reinforcement  Comment: safety during ADL    ADL Training, taught by Neida Velazquez OT at 10/2/2024  1:51 PM.  Learner: Patient  Readiness: Acceptance  Method: Explanation, Demonstration  Response: Demonstrated Understanding, Verbalizes Understanding, Needs Reinforcement  Comment: safety during ADL    Education Comments  No comments found.        Goals:   Encounter Problems       Encounter Problems (Active)       ADLs       Patient will perform UB and LB bathing  with modified independent level of assistance. (Progressing)       Start:  10/02/24    Expected End:  10/16/24            Patient with complete lower body dressing with modified independent level of assistance  (Progressing)       Start:  10/02/24    Expected End:  10/16/24            Patient will complete toileting including hygiene clothing management/hygiene with modified independent level of assistance. (Progressing)       Start:  10/02/24    Expected End:  10/16/24               BALANCE       Pt will maintain dynamic standing balance during ADL task with modified independent level of assistance in order to demonstrate decreased risk of falling and improved postural control. (Progressing)       Start:  10/02/24    Expected End:  10/16/24

## 2024-10-02 NOTE — CARE PLAN
Problem: Diabetes  Goal: Achieve decreasing blood glucose levels by end of shift  Outcome: Progressing  Goal: Increase stability of blood glucose readings by end of shift  Outcome: Progressing  Goal: Decrease in ketones present in urine by end of shift  Outcome: Progressing  Goal: Maintain electrolyte levels within acceptable range throughout shift  Outcome: Progressing  Goal: Maintain glucose levels >70mg/dl to <250mg/dl throughout shift  Outcome: Progressing  Goal: No changes in neurological exam by end of shift  Outcome: Progressing  Goal: Learn about and adhere to nutrition recommendations by end of shift  Outcome: Progressing  Goal: Vital signs within normal range for age by end of shift  Outcome: Progressing  Goal: Increase self care and/or family involovement by end of shift  Outcome: Progressing  Goal: Receive DSME education by end of shift  Outcome: Progressing     Problem: Pain - Adult  Goal: Verbalizes/displays adequate comfort level or baseline comfort level  Outcome: Progressing     Problem: Safety - Adult  Goal: Free from fall injury  Outcome: Progressing     Problem: Discharge Planning  Goal: Discharge to home or other facility with appropriate resources  Outcome: Progressing     Problem: Chronic Conditions and Co-morbidities  Goal: Patient's chronic conditions and co-morbidity symptoms are monitored and maintained or improved  Outcome: Progressing     Problem: Skin  Goal: Decreased wound size/increased tissue granulation at next dressing change  Outcome: Progressing  Flowsheets (Taken 10/2/2024 1130)  Decreased wound size/increased tissue granulation at next dressing change: Promote sleep for wound healing  Goal: Participates in plan/prevention/treatment measures  Outcome: Progressing  Flowsheets (Taken 10/2/2024 1130)  Participates in plan/prevention/treatment measures: Increase activity/out of bed for meals  Goal: Prevent/manage excess moisture  Outcome: Progressing  Flowsheets (Taken 10/2/2024  1130)  Prevent/manage excess moisture: Moisturize dry skin  Goal: Prevent/minimize sheer/friction injuries  Outcome: Progressing  Flowsheets (Taken 10/2/2024 1130)  Prevent/minimize sheer/friction injuries: Use pull sheet  Goal: Promote/optimize nutrition  Outcome: Progressing  Flowsheets (Taken 10/2/2024 1130)  Promote/optimize nutrition: Consume > 50% meals/supplements  Goal: Promote skin healing  Outcome: Progressing  Flowsheets (Taken 10/2/2024 1130)  Promote skin healing: Turn/reposition every 2 hours/use positioning/transfer devices     Problem: Fall/Injury  Goal: Not fall by end of shift  Outcome: Progressing  Goal: Be free from injury by end of the shift  Outcome: Progressing  Goal: Verbalize understanding of personal risk factors for fall in the hospital  Outcome: Progressing  Goal: Verbalize understanding of risk factor reduction measures to prevent injury from fall in the home  Outcome: Progressing  Goal: Use assistive devices by end of the shift  Outcome: Progressing  Goal: Pace activities to prevent fatigue by end of the shift  Outcome: Progressing     Problem: General Stroke  Goal: Establish a mutual long term goal with patient by discharge  Outcome: Progressing  Goal: Demonstrate improvement in neurological exam throughout the shift  Outcome: Progressing  Goal: Maintain BP within ordered limits throughout shift  Outcome: Progressing  Goal: Participate in treatment (ie., meds, therapy) throughout shift  Outcome: Progressing  Goal: No symptoms of aspiration throughout shift  Outcome: Progressing  Goal: No symptoms of hemorrhage throughout shift  Outcome: Progressing  Goal: Tolerate enteral feeding throughout shift  Outcome: Progressing  Goal: Decreased nausea/vomiting throughout shift  Outcome: Progressing  Goal: Controlled blood glucose throughout shift  Outcome: Progressing  Goal: Out of bed three times today  Outcome: Progressing   The patient's goals for the shift include rest    The clinical goals  for the shift include safety-comfort    Over the shift, the patient did not make progress toward the following goals. Barriers to progression include . Recommendations to address these barriers include .

## 2024-10-02 NOTE — H&P
History Of Present Illness  Carmen Vinson is a 89 y.o. male  Who was brought by EMS to the emergency room after having a fall and striking his head.  On presentation, blood pressure 206/121, heart rate 84, respiratory rate 16, afebrile, saturation oxygen 96% on room air.  Pertinent findings on blood workup; hemoglobin 11.8, glucose 225, creatinine 1.32 which is around his baseline.  CT of the head showed a focal loss of gray-white differentiation and hypoattenuation of the left occipital lobe that could reflect an acute infarct.  Also, CT of the upper chest showed a trace right apical pneumothorax.  Right hand x-ray did not show any acute fractures.  Patient was given in the emergency room 20 mg of IV labetalol and then admitted to the medical service for further investigation and management.  Upon encounter, patient resting comfortably in his bed.  He has severe hearing loss.  It was very difficult to get a detailed history from him.  He told me that he had dizziness and then his leg gave out and he fell.  He did not lose consciousness.  This is the first time that this happens to him.  Denies having any palpitations.  No chest pain.  And denies having any blurry vision.  Denies having any weakness or deficits in his extremities.     ROS  10 systems were reviewed and were negative except for those noted in the history of present illness.    Past Medical History  Past Medical History:   Diagnosis Date    Personal history of other diseases of male genital organs 11/09/2019    History of prostatitis    Personal history of other malignant neoplasm of large intestine 11/09/2019    History of malignant neoplasm of colon     Pertinent medical history also documented in my below narrative    Surgical History  Past Surgical History:   Procedure Laterality Date    OTHER SURGICAL HISTORY  06/10/2022    Medial branch block    OTHER SURGICAL HISTORY  09/10/2021    Medial branch block    OTHER SURGICAL HISTORY  10/08/2021     Mohs surgery    OTHER SURGICAL HISTORY  03/19/2020    Thyroidectomy    OTHER SURGICAL HISTORY  03/19/2020    Inguinal hernia repair    OTHER SURGICAL HISTORY  03/19/2020    Knee arthroscopy    OTHER SURGICAL HISTORY  03/19/2020    Kidney surgery    OTHER SURGICAL HISTORY  10/08/2021    Sigmoidoscopy    OTHER SURGICAL HISTORY  03/19/2020    Colectomy    OTHER SURGICAL HISTORY  03/19/2020    Coronary artery bypass graft    OTHER SURGICAL HISTORY  03/19/2020    Knee replacement    OTHER SURGICAL HISTORY  03/19/2020    Colonoscopy      Pertinent surgical history also documented in my below narrative    Social History  He reports that he has never smoked. He has never used smokeless tobacco. Drug use questions deferred to the physician. He reports that he does not drink alcohol.    Family History  Family History   Problem Relation Name Age of Onset    Other (CARDIAC DISORDER) Mother      Hypertension Mother      Heart attack Mother      Other (CARDIAC DISORDER) Father      Prostate cancer Father      Heart attack Father      Other (CARDIAC DISORDER) Brother      Heart attack Brother          Allergies  Fenoprofen, Fish containing products, and Valdecoxib    Medications Prior to Admission   Medication Sig Dispense Refill Last Dose    calcium carbonate 260 mg calcium (648 mg) tablet tablet Take by mouth. 2 TABS ORALLY ONCE A DAY   9/30/2024 at 2000    finasteride (Proscar) 5 mg tablet Take 1 tablet (5 mg) by mouth once daily. 90 tablet 3 10/1/2024 at 2000    furosemide (Lasix) 20 mg tablet Take 1 tablet (20 mg) by mouth once daily. 90 tablet 3 10/1/2024 at 0800    isosorbide mononitrate ER (Imdur) 30 mg 24 hr tablet Take 1 tablet (30 mg) by mouth once daily. as directed 90 tablet 3 10/1/2024 at 0800    levothyroxine (Synthroid, Levoxyl) 137 mcg tablet Take 1 tablet (137 mcg) by mouth once daily. 90 tablet 3 10/1/2024 at 0800    lisinopril 5 mg tablet Take 1 tablet (5 mg) by mouth once daily.   10/1/2024 at 0800     "pioglitazone (Actos) 15 mg tablet Take 1 tablet (15 mg) by mouth once daily. 90 tablet 3 10/1/2024 at 0800    simvastatin (Zocor) 40 mg tablet Take 1 tablet (40 mg) by mouth once daily. 90 tablet 3 10/1/2024 at 2000    acetaminophen (Tylenol 8 Hour) 650 mg ER tablet Take 1 tablet (650 mg) by mouth.   Unknown    aspirin-calcium carbonate 81 mg-300 mg calcium(777 mg) tablet Take by mouth.   Unknown    lubricating eye drops ophthalmic solution Administer 1 drop into both eyes 2 times a day as needed for dry eyes.       mometasone (Elocon) 0.1 % lotion Apply topically once daily. 60 mL 11 Unknown    nystatin (Mycostatin) cream twice a day. APPLY AND RUB IN A THIN FILM TO AFFECTED AREAS TWICE DAILY. AM & PM.   Unknown        Last Recorded Vitals  Blood pressure 178/69, pulse 70, temperature 36.3 °C (97.3 °F), temperature source Temporal, resp. rate 16, height 1.651 m (5' 5\"), weight 77 kg (169 lb 12.1 oz), SpO2 93%.    Physical Exam  Constitutional:       General: He is not in acute distress.     Appearance: He is not ill-appearing.      Comments: Awake alert and oriented x3   HENT:      Ears:      Comments: Hearing loss     Mouth/Throat:      Pharynx: Oropharynx is clear.   Eyes:      Pupils: Pupils are equal, round, and reactive to light.   Cardiovascular:      Rate and Rhythm: Normal rate and regular rhythm.      Heart sounds: Normal heart sounds.   Pulmonary:      Effort: No respiratory distress.      Breath sounds: Normal breath sounds. No wheezing or rhonchi.   Abdominal:      General: Abdomen is flat. Bowel sounds are normal. There is no distension.      Palpations: Abdomen is soft.      Tenderness: There is no abdominal tenderness.   Musculoskeletal:         General: No swelling.      Comments: There is +1 pitting edema in both legs and nonpitting in the feet   Skin:     General: Skin is warm.      Comments: Bruising noted along the lateral side of the right periorbital area   Neurological:      General: No focal " deficit present.   Psychiatric:         Mood and Affect: Mood normal.         Behavior: Behavior normal.         Thought Content: Thought content normal.         Judgment: Judgment normal.           Relevant Results  Results for orders placed or performed during the hospital encounter of 10/01/24 (from the past 24 hour(s))   CBC and Auto Differential   Result Value Ref Range    WBC 6.8 4.4 - 11.3 x10*3/uL    nRBC 0.0 0.0 - 0.0 /100 WBCs    RBC 3.34 (L) 4.50 - 5.90 x10*6/uL    Hemoglobin 11.1 (L) 13.5 - 17.5 g/dL    Hematocrit 32.1 (L) 41.0 - 52.0 %    MCV 96 80 - 100 fL    MCH 33.2 26.0 - 34.0 pg    MCHC 34.6 32.0 - 36.0 g/dL    RDW 11.6 11.5 - 14.5 %    Platelets 213 150 - 450 x10*3/uL    Neutrophils % 68.4 40.0 - 80.0 %    Immature Granulocytes %, Automated 0.4 0.0 - 0.9 %    Lymphocytes % 16.9 13.0 - 44.0 %    Monocytes % 13.0 2.0 - 10.0 %    Eosinophils % 1.0 0.0 - 6.0 %    Basophils % 0.3 0.0 - 2.0 %    Neutrophils Absolute 4.64 1.60 - 5.50 x10*3/uL    Immature Granulocytes Absolute, Automated 0.03 0.00 - 0.50 x10*3/uL    Lymphocytes Absolute 1.15 0.80 - 3.00 x10*3/uL    Monocytes Absolute 0.88 (H) 0.05 - 0.80 x10*3/uL    Eosinophils Absolute 0.07 0.00 - 0.40 x10*3/uL    Basophils Absolute 0.02 0.00 - 0.10 x10*3/uL   Basic metabolic panel   Result Value Ref Range    Glucose 225 (H) 74 - 99 mg/dL    Sodium 139 136 - 145 mmol/L    Potassium 3.8 3.5 - 5.3 mmol/L    Chloride 107 98 - 107 mmol/L    Bicarbonate 24 21 - 32 mmol/L    Anion Gap 12 10 - 20 mmol/L    Urea Nitrogen 25 (H) 6 - 23 mg/dL    Creatinine 1.32 (H) 0.50 - 1.30 mg/dL    eGFR 49 (L) >60 mL/min/1.73m*2    Calcium 9.2 8.6 - 10.3 mg/dL   Type And Screen   Result Value Ref Range    ABO TYPE AB     Rh TYPE POS     ANTIBODY SCREEN NEG         CT chest abdomen pelvis w IV contrast    Result Date: 10/1/2024  Interpreted By:  Jacky Diallo, STUDY: CT CHEST ABDOMEN PELVIS W IV CONTRAST;  10/1/2024 8:34 pm   INDICATION: Signs/Symptoms:trauma = pneumothorax on  CT   COMPARISON: None.   ACCESSION NUMBER(S): [id number]   ORDERING CLINICIAN: JORDAN COOPER   TECHNIQUE: CT of the chest, abdomen, and pelvis was performed. Contiguous axial images were obtained through the chest, abdomen and pelvis. Coronal and sagittal reconstructions were performed.   The images were obtained in the late arterial and portal venous phases.   90 ml of contrast material Omnipaque 350 were administered intravenously without immediate complication.   FINDINGS: CHEST:   LUNG/PLEURA/LARGE AIRWAYS: There are no pleural effusions. Pneumothorax is not demonstrated on chest CT. There are no consolidations. The tracheobronchial tree is patent. Mild dependent atelectasis.   VESSELS: No traumatic aortic injury is appreciated within the limitations of this non-EKG gated study.  The thoracic aorta is of normal course and caliber.   Main pulmonary artery and its branches are normal in caliber.   HEART: The heart is normal in size.   There is no pericardial effusion. Heavy coronary artery atherosclerotic calcifications.   MEDIASTINUM AND NATIVIDAD: No pneumomediastinum, abnormal mediastinal fluid collection or mediastinal hematoma is appreciated. No mediastinal, hilar or axillary adenopathy is present.  The esophagus is grossly normal.   CHEST WALL AND LOWER NECK: No acute fracture or dislocation of the included osseous structures are appreciated.  No suspicious osseous lesions are identified.  The thoracic wall soft tissues are within normal limits.   ABDOMEN:   LIVER: The liver is normal in size and contour. There is no subcapsular hematoma, no perihepatic fluid collection.  There are no suspicious hepatic lesions.   GALLBLADDER: The gallbladder is nondistended, without evidence of radiopaque stone.   BILE DUCTS: The intahepatic and extrahepatic bile ducts are not dilated.   PANCREAS: The pancreas appears unremarkable.   SPLEEN: No parenchymal perfusion deficit of the spleen is appreciated to suggest contusion or  laceration. There is no subcapsular hematoma, no perisplenic fluid collection.   ADRENAL GLANDS: The bilateral adrenal glands are unremarkable in appearance.   KIDNEYS AND URETERS: Normal size and enhancement. No hydronephrosis or obstructive nephrolithiasis. There are bilateral intrarenal vascular atherosclerotic calcifications. Multiple bilateral renal cysts with exophytic cyst arising from the left lower pole kidney measuring 9.0 x 9.5 cm.   PELVIS:   BLADDER: The urinary bladder is grossly normal.   REPRODUCTIVE ORGANS: Marked prostatomegaly with prostate measuring 6.2 x 5.9 cm axially.   BOWEL: Moderate hiatal hernia. The stomach is otherwise grossly normal. The small bowel is normal in caliber without evidence of focal wall thickening or obstruction.  There is no evidence of focal wall thickening or dilatation of the large bowel.  Normal appendix. Postsurgical change of sigmoid resection and anastomosis.   VESSELS: The aorta and IVC are normal in caliber. Circumferential atherosclerotic plaque of the abdominal aorta and its branches.. The principal vasculature of the abdomen and pelvis is patent.   PERITONEUM/RETROPERITONEUM/LYMPH NODES: There is no evidence of intra- or retroperitoneal hematoma.  There is no free or loculated fluid collection, no free intraperitoneal air. No abdominopelvic lymphadenopathy is present.   BONES AND ABDOMINAL WALL: No evidence of acute fracture or dislocation of the included osseous structures. Vertebral bodies are intact. Mild degenerative anterolisthesis of L4 over L5 with bilateral moderate foraminal stenosis at L4-L5 and L5-S1. No high-grade central canal stenosis. No suspicious osseous lesions are identified.  The abdominal wall soft tissues appear normal.       CHEST 1.  No evidence of acute traumatic injury in the chest. Heavy coronary artery atherosclerotic calcifications.   ABDOMEN - PELVIS 1.  No evidence of acute traumatic abnormality in the abdomen/pelvis. 2. Moderate  hiatal hernia with fluid within the hiatal hernia suggestive of esophageal reflux. Marked prostatomegaly. Bilateral renal cysts with largest exophytic left renal cyst measuring 9.5 cm.     Signed by: Jacky Diallo 10/1/2024 9:36 PM Dictation workstation:   [id number]    XR hand right 3+ views    Result Date: 10/1/2024  Interpreted By:  Gill Lindsey, STUDY: XR HAND RIGHT 3+ VIEWS; ;  10/1/2024 5:10 pm   INDICATION: Signs/Symptoms:trauma.   COMPARISON: None.   ACCESSION NUMBER(S): [id number]   ORDERING CLINICIAN: JORDAN COOPER   FINDINGS: No acute fracture or dislocation. No significant soft tissue swelling. Osteoarthritis of the interphalangeal joints, thumb, index, middle finger MCP joints, and thumb CMC joint. Atherosclerosis. Type 2 os navicular. Chronic osteochondral body versus old avulsion injury dorsal aspect of the wrist measuring 4 mm. Chondrocalcinosis of the wrist.       No acute osseous abnormality.   Osteoarthritis.   Signed by: Gill Lindsey 10/1/2024 5:50 PM Dictation workstation:   [id number]    CT head wo IV contrast    Result Date: 10/1/2024  Interpreted By:  Tim Gray, STUDY: CT CERVICAL SPINE WO IV CONTRAST; CT HEAD WO IV CONTRAST;  10/1/2024 4:52 pm   INDICATION: Signs/Symptoms:trauma.   COMPARISON: None.   ACCESSION NUMBER(S): [id number]; [id number]   ORDERING CLINICIAN: JORDAN COOPER   TECHNIQUE: Axial noncontrast CT images of the head and cervical spine. Sagittal and coronal reformats were provided.   FINDINGS: Head: BRAIN: No acute intracranial hemorrhage. No mass effect or midline shift. There is focal loss of gray-white differentiation and hypoattenuation of the left occipital lobe as seen on image 34/88.Patchy white matter hypodensities which are nonspecific but likely related to microangiopathic white matter changes.   VENTRICLES and EXTRA-AXIAL SPACES: Prominent ventricles and extra-axial CSF spaces, reflecting parenchymal volume loss.   EXTRACRANIAL SOFT  TISSUES:  Right frontal/periorbital scalp contusion.   PARANASAL SINUSES/MASTOIDS: The visualized paranasal sinuses and mastoid air cells are aerated.   BONES AND ORBITS: No displaced skull fracture. Orbits are within normal limits.   OTHER FINDINGS: None.     Cervical Spine:   FRACTURES: There is no evidence for an acute fracture of the cervical spine.   VERTEBRAL ALIGNMENT: Within normal limits.   CRANIOCERVICAL JUNCTION: Within normal limits.   VERTEBRA/DISC SPACES: Moderate multilevel disc space narrowing, endplate osteophytes, and facet/uncovertebral arthropathy.   No high-grade osseous spinal canal stenosis. There is multilevel neural foraminal stenosis secondary to uncovertebral and facet arthropathy which is most pronounced at C5-C6, where there is moderate bilateral neural foraminal narrowing. There is also moderate foraminal narrowing at the left C6-C7 level.   SOFT TISSUES: Moderate carotid atherosclerosis.   UPPER CHEST: Trace right apical pneumothorax, image 82/714.       1. No acute intracranial hemorrhage or mass effect. 2. No acute fracture or traumatic malalignment of the cervical spine. 3. Focal loss of gray-white differentiation and hypoattenuation of the left occipital lobe as seen on image 34/88.  Findings could reflect acute infarct. Correlation with MRI is suggested. 4. Trace right apical pneumothorax, image 82/714.   MACRO: Tim Gray discussed the significance and urgency of this critical finding by telephone with  Lida JOHNSON NP on 10/1/2024 at 5:27 pm.  (**-RCF-**) Findings:  See findings.   Signed by: Tim Gray 10/1/2024 5:28 PM Dictation workstation:   [id number]    CT cervical spine wo IV contrast    Result Date: 10/1/2024  Interpreted By:  Tim Gray, STUDY: CT CERVICAL SPINE WO IV CONTRAST; CT HEAD WO IV CONTRAST;  10/1/2024 4:52 pm   INDICATION: Signs/Symptoms:trauma.   COMPARISON: None.   ACCESSION NUMBER(S): [id number]; [id number]   ORDERING  CLINICIAN: JORDAN COOPER   TECHNIQUE: Axial noncontrast CT images of the head and cervical spine. Sagittal and coronal reformats were provided.   FINDINGS: Head: BRAIN: No acute intracranial hemorrhage. No mass effect or midline shift. There is focal loss of gray-white differentiation and hypoattenuation of the left occipital lobe as seen on image 34/88.Patchy white matter hypodensities which are nonspecific but likely related to microangiopathic white matter changes.   VENTRICLES and EXTRA-AXIAL SPACES: Prominent ventricles and extra-axial CSF spaces, reflecting parenchymal volume loss.   EXTRACRANIAL SOFT TISSUES:  Right frontal/periorbital scalp contusion.   PARANASAL SINUSES/MASTOIDS: The visualized paranasal sinuses and mastoid air cells are aerated.   BONES AND ORBITS: No displaced skull fracture. Orbits are within normal limits.   OTHER FINDINGS: None.     Cervical Spine:   FRACTURES: There is no evidence for an acute fracture of the cervical spine.   VERTEBRAL ALIGNMENT: Within normal limits.   CRANIOCERVICAL JUNCTION: Within normal limits.   VERTEBRA/DISC SPACES: Moderate multilevel disc space narrowing, endplate osteophytes, and facet/uncovertebral arthropathy.   No high-grade osseous spinal canal stenosis. There is multilevel neural foraminal stenosis secondary to uncovertebral and facet arthropathy which is most pronounced at C5-C6, where there is moderate bilateral neural foraminal narrowing. There is also moderate foraminal narrowing at the left C6-C7 level.   SOFT TISSUES: Moderate carotid atherosclerosis.   UPPER CHEST: Trace right apical pneumothorax, image 82/714.       1. No acute intracranial hemorrhage or mass effect. 2. No acute fracture or traumatic malalignment of the cervical spine. 3. Focal loss of gray-white differentiation and hypoattenuation of the left occipital lobe as seen on image 34/88.  Findings could reflect acute infarct. Correlation with MRI is suggested. 4. Trace right apical  pneumothorax, image 82/714.   MACRO: Tim Gray discussed the significance and urgency of this critical finding by telephone with  Lida JOHNSON NP on 10/1/2024 at 5:27 pm.  (**-RCF-**) Findings:  See findings.   Signed by: Tim Gray 10/1/2024 5:28 PM Dictation workstation:   [id number]       Assessment/Plan     89-year-old male with a past medical history of hypertension, anxiety, BPH, coronary artery disease, diabetes mellitus, hyperlipidemia, hypothyroidism, lower extremities edema on Lasix, kidney stones, insomnia, renal cysts, hiatal hernia, who was brought by EMS to the emergency room after having a mechanical fall while walking at a retail store.  On presentation, patient was found to have a hypertensive urgency. CT of the head showed a focal loss of gray-white differentiation and hypoattenuation of the left occipital lobe that could reflect an acute infarct.  Also, CT of the upper chest showed a trace right apical pneumothorax.    I will admit the patient to the inpatient medical service with telemetry and vital signs monitoring.  Based on the clinical presentation that the patient presented with and the history that he gave, I do have concerns regarding a possible cerebrovascular event.  I will therefore initiate a stroke workup.  Neurochecks every 4 hours for the first 72 hours.  Check lipid panel and HbA1c level.  MRI of the brain and MRA.  Echocardiogram.  Carotid Dopplers.  I will check orthostatics in the morning.  Until the results are back, I will continue for now with the aspirin 81 mg daily and consult teleneuro.  Replace the simvastatin by atorvastatin.  Regarding the elevated blood pressure, I will continue for now home dose Imdur and lisinopril and monitor.  For the questionable pneumothorax, this is not of clinical significance.  So we can monitor.  Resume the rest of home medications except for the Lasix  Insulin sliding scale  SCDs for DVT prophylaxis  Full  code    (This note was generated with voice recognition software and may contain errors including spelling, grammar, syntax and misrecognition of what was dictated, that are not fully corrected)     Saul Johnson MD

## 2024-10-03 LAB
ALBUMIN SERPL BCP-MCNC: 3.3 G/DL (ref 3.4–5)
ALP SERPL-CCNC: 39 U/L (ref 33–136)
ALT SERPL W P-5'-P-CCNC: 15 U/L (ref 10–52)
ANION GAP SERPL CALC-SCNC: 10 MMOL/L (ref 10–20)
AST SERPL W P-5'-P-CCNC: 18 U/L (ref 9–39)
BILIRUB SERPL-MCNC: 0.5 MG/DL (ref 0–1.2)
BUN SERPL-MCNC: 25 MG/DL (ref 6–23)
CALCIUM SERPL-MCNC: 9 MG/DL (ref 8.6–10.3)
CHLORIDE SERPL-SCNC: 110 MMOL/L (ref 98–107)
CO2 SERPL-SCNC: 24 MMOL/L (ref 21–32)
CREAT SERPL-MCNC: 1.29 MG/DL (ref 0.5–1.3)
EGFRCR SERPLBLD CKD-EPI 2021: 50 ML/MIN/1.73M*2
ERYTHROCYTE [DISTWIDTH] IN BLOOD BY AUTOMATED COUNT: 11.8 % (ref 11.5–14.5)
GLUCOSE BLD MANUAL STRIP-MCNC: 114 MG/DL (ref 74–99)
GLUCOSE BLD MANUAL STRIP-MCNC: 139 MG/DL (ref 74–99)
GLUCOSE BLD MANUAL STRIP-MCNC: 142 MG/DL (ref 74–99)
GLUCOSE BLD MANUAL STRIP-MCNC: 151 MG/DL (ref 74–99)
GLUCOSE SERPL-MCNC: 107 MG/DL (ref 74–99)
HCT VFR BLD AUTO: 29.8 % (ref 41–52)
HGB BLD-MCNC: 10 G/DL (ref 13.5–17.5)
MCH RBC QN AUTO: 32.6 PG (ref 26–34)
MCHC RBC AUTO-ENTMCNC: 33.6 G/DL (ref 32–36)
MCV RBC AUTO: 97 FL (ref 80–100)
NRBC BLD-RTO: 0 /100 WBCS (ref 0–0)
PLATELET # BLD AUTO: 192 X10*3/UL (ref 150–450)
POTASSIUM SERPL-SCNC: 3.8 MMOL/L (ref 3.5–5.3)
PROT SERPL-MCNC: 5.5 G/DL (ref 6.4–8.2)
RBC # BLD AUTO: 3.07 X10*6/UL (ref 4.5–5.9)
SODIUM SERPL-SCNC: 140 MMOL/L (ref 136–145)
WBC # BLD AUTO: 7.7 X10*3/UL (ref 4.4–11.3)

## 2024-10-03 PROCEDURE — 36415 COLL VENOUS BLD VENIPUNCTURE: CPT | Performed by: INTERNAL MEDICINE

## 2024-10-03 PROCEDURE — 85027 COMPLETE CBC AUTOMATED: CPT | Performed by: INTERNAL MEDICINE

## 2024-10-03 PROCEDURE — 2500000005 HC RX 250 GENERAL PHARMACY W/O HCPCS: Performed by: INTERNAL MEDICINE

## 2024-10-03 PROCEDURE — 82947 ASSAY GLUCOSE BLOOD QUANT: CPT

## 2024-10-03 PROCEDURE — 99232 SBSQ HOSP IP/OBS MODERATE 35: CPT | Performed by: STUDENT IN AN ORGANIZED HEALTH CARE EDUCATION/TRAINING PROGRAM

## 2024-10-03 PROCEDURE — 1200000002 HC GENERAL ROOM WITH TELEMETRY DAILY

## 2024-10-03 PROCEDURE — 94761 N-INVAS EAR/PLS OXIMETRY MLT: CPT

## 2024-10-03 PROCEDURE — 97530 THERAPEUTIC ACTIVITIES: CPT | Mod: GO

## 2024-10-03 PROCEDURE — 2500000001 HC RX 250 WO HCPCS SELF ADMINISTERED DRUGS (ALT 637 FOR MEDICARE OP): Performed by: STUDENT IN AN ORGANIZED HEALTH CARE EDUCATION/TRAINING PROGRAM

## 2024-10-03 PROCEDURE — 80053 COMPREHEN METABOLIC PANEL: CPT | Performed by: INTERNAL MEDICINE

## 2024-10-03 PROCEDURE — 2500000001 HC RX 250 WO HCPCS SELF ADMINISTERED DRUGS (ALT 637 FOR MEDICARE OP): Performed by: INTERNAL MEDICINE

## 2024-10-03 RX ORDER — HYDROXYZINE HYDROCHLORIDE 10 MG/1
10 TABLET, FILM COATED ORAL 2 TIMES DAILY PRN
Status: DISCONTINUED | OUTPATIENT
Start: 2024-10-03 | End: 2024-10-04 | Stop reason: HOSPADM

## 2024-10-03 RX ORDER — HYDROCHLOROTHIAZIDE 12.5 MG/1
12.5 CAPSULE ORAL DAILY
Status: DISCONTINUED | OUTPATIENT
Start: 2024-10-03 | End: 2024-10-04

## 2024-10-03 RX ORDER — LISINOPRIL 20 MG/1
20 TABLET ORAL DAILY
Status: DISCONTINUED | OUTPATIENT
Start: 2024-10-04 | End: 2024-10-03

## 2024-10-03 RX ORDER — LISINOPRIL 10 MG/1
10 TABLET ORAL ONCE
Status: DISCONTINUED | OUTPATIENT
Start: 2024-10-03 | End: 2024-10-03

## 2024-10-03 RX ORDER — AMLODIPINE BESYLATE 10 MG/1
10 TABLET ORAL DAILY
Status: DISCONTINUED | OUTPATIENT
Start: 2024-10-03 | End: 2024-10-04 | Stop reason: HOSPADM

## 2024-10-03 ASSESSMENT — COGNITIVE AND FUNCTIONAL STATUS - GENERAL
CLIMB 3 TO 5 STEPS WITH RAILING: A LITTLE
WALKING IN HOSPITAL ROOM: A LITTLE
MOBILITY SCORE: 20
DAILY ACTIVITIY SCORE: 24
STANDING UP FROM CHAIR USING ARMS: A LITTLE
MOVING TO AND FROM BED TO CHAIR: A LITTLE

## 2024-10-03 ASSESSMENT — PAIN SCALES - GENERAL
PAINLEVEL_OUTOF10: 0 - NO PAIN

## 2024-10-03 ASSESSMENT — PAIN - FUNCTIONAL ASSESSMENT
PAIN_FUNCTIONAL_ASSESSMENT: 0-10

## 2024-10-03 NOTE — PROGRESS NOTES
Pt reviewed in care rounds this morning. Pt not medically ready for discharge. ADOD is 24-48 hours. Plan is for Pt to discharge home / [facility] home health care.

## 2024-10-03 NOTE — PROGRESS NOTES
Occupational Therapy    OT Treatment    Patient Name: Carmen Vinson  MRN: [id number]  Department: [facility]  Room: [facility]-  Today's Date: 10/3/2024  Time Calculation  Start Time: 0750  Stop Time: 0815  Time Calculation (min): 25 min        Assessment:  OT Assessment: pt with improved balance during mobility and ADL this date with no LOB backwards.  pt has no c/o dizziness throughout session but did state that he was dizzy when going to the bathroom earlier.  pt asking if he should take medications for anxiety.  educated pt to speak to doctor regarding this matter and notified hospitalist of pt's question.  Overall, pt much improved compared to yesterday.  Cont with current OT POC.  Prognosis: Excellent  Barriers to Discharge: None  Evaluation/Treatment Tolerance: Patient tolerated treatment well  Medical Staff Made Aware: Yes  End of Session Communication: Bedside nurse, Physician  End of Session Patient Position: Up in chair, Alarm on  OT Assessment Results: Decreased ADL status, Decreased functional mobility  Prognosis: Excellent  Barriers to Discharge: None  Evaluation/Treatment Tolerance: Patient tolerated treatment well  Medical Staff Made Aware: Yes  Plan:  Treatment Interventions: ADL retraining, Visual perceptual retraining, Neuromuscular reeducation  OT Frequency: 3 times per week  OT Discharge Recommendations: Low intensity level of continued care  OT Recommended Transfer Status: Assist of 1  OT - OK to Discharge: Yes (once medically stable)  Treatment Interventions: ADL retraining, Visual perceptual retraining, Neuromuscular reeducation    Subjective   Previous Visit Info:  OT Last Visit  OT Received On: 10/03/24  General:  General  Reason for Referral: Admitted 10/1 after a mechanical fall while ambulating at a retail store. Patient hit head and injured R hand. MRI showed lateral L occipital lobe area of subacute ischemia with punctate petechial hemorrhage along cortex.  Referred By: Matias Johnson  Past Medical  History Relevant to Rehab: Colon cancer, HTN, anxiety, BPH, CAD, DM, HLD, hypothyroidism, LE edema, kidney stones, insomnia, renal cysts hiatal hernia  Family/Caregiver Present: No  Co-Treatment: PT  Co-Treatment Reason: to maximize patient safety and mobility while focusing on discipline specific goals.  Prior to Session Communication: Bedside nurse  Patient Position Received: Up in chair, Alarm on (student nurse present)  General Comment: pt agreeable to session. pt has no c/i dizziness during session but stated that he was dizzy when he went to the bathroom about 30 minutes ago.  Precautions:  Medical Precautions: Fall precautions, Other (comment)  Post-Surgical Precautions:  (orthostatic positive)    Vitals:    supine 199/67 67 bpm  sit 191/64 71 bpm  stand 181/51 70 bpm  sp ambulation 162/62 74 bpm     Pain:  Pain Assessment  Pain Assessment: 0-10  0-10 (Numeric) Pain Score: 0 - No pain    Objective    Cognition:  Cognition  Overall Cognitive Status: Within Functional Limits  Orientation Level: Oriented X4  Coordination:  Movements are Fluid and Coordinated: Yes  Finger to Nose: Intact  Finger to Target: Intact       Bed Mobility/Transfers: Bed Mobility  Bed Mobility: Yes  Bed Mobility 1  Bed Mobility 1: Supine to sitting, Sitting to supine  Level of Assistance 1: Independent    Transfers  Transfer: Yes  Transfer 1  Technique 1: Sit to stand, Stand to sit  Transfer Device 1: Walker, Gait belt  Transfer Level of Assistance 1: Close supervision, Moderate verbal cues  Trials/Comments 1: for correct hand placement.  no LOB backwards this date    Toilet Transfers  Toilet Transfers:  (SBA with FWW and gait belt)    Functional Mobility:  Functional Mobility  Functional Mobility Performed: Yes  Functional Mobility 1  Surface 1: Level tile  Device 1: Rolling walker  Functional Mobility Support Devices: Gait belt  Assistance 1: Close supervision  Comments 1: improved safety    Strength:  Strength Comments: MORRO  WNL      Outcome Measures:[facility] Daily Activity  Putting on and taking off regular lower body clothing: A lot  Bathing (including washing, rinsing, drying): A little  Putting on and taking off regular upper body clothing: A little  Toileting, which includes using toilet, bedpan or urinal: A little  Taking care of personal grooming such as brushing teeth: A little  Eating Meals: None  Daily Activity - Total Score: 18        Education Documentation  Body Mechanics, taught by Neida Velazquez OT at 10/3/2024  8:36 AM.  Learner: Patient  Readiness: Acceptance  Method: Explanation, Demonstration  Response: Demonstrated Understanding, Needs Reinforcement  Comment: safety with FWW during mobility and ADL    Precautions, taught by Neida Velazquez OT at 10/3/2024  8:36 AM.  Learner: Patient  Readiness: Acceptance  Method: Explanation, Demonstration  Response: Demonstrated Understanding, Needs Reinforcement  Comment: safety with FWW during mobility and ADL    ADL Training, taught by Neida Velazquez OT at 10/3/2024  8:36 AM.  Learner: Patient  Readiness: Acceptance  Method: Explanation, Demonstration  Response: Demonstrated Understanding, Needs Reinforcement  Comment: safety with FWW during mobility and ADL    Body Mechanics, taught by Neida Velazquez OT at 10/2/2024  1:51 PM.  Learner: Patient  Readiness: Acceptance  Method: Explanation, Demonstration  Response: Demonstrated Understanding, Verbalizes Understanding, Needs Reinforcement  Comment: safety during ADL    Precautions, taught by Neida Velazquez OT at 10/2/2024  1:51 PM.  Learner: Patient  Readiness: Acceptance  Method: Explanation, Demonstration  Response: Demonstrated Understanding, Verbalizes Understanding, Needs Reinforcement  Comment: safety during ADL    ADL Training, taught by Neida Velazquez OT at 10/2/2024  1:51 PM.  Learner: Patient  Readiness: Acceptance  Method: Explanation, Demonstration  Response: Demonstrated Understanding, Verbalizes Understanding, Needs  Reinforcement  Comment: safety during ADL    Education Comments  No comments found.        OP EDUCATION:       Goals:  Encounter Problems       Encounter Problems (Active)       ADLs       Patient will perform UB and LB bathing  with modified independent level of assistance. (Progressing)       Start:  10/02/24    Expected End:  10/16/24            Patient with complete lower body dressing with modified independent level of assistance  (Progressing)       Start:  10/02/24    Expected End:  10/16/24            Patient will complete toileting including hygiene clothing management/hygiene with modified independent level of assistance. (Progressing)       Start:  10/02/24    Expected End:  10/16/24               BALANCE       Pt will maintain dynamic standing balance during ADL task with modified independent level of assistance in order to demonstrate decreased risk of falling and improved postural control. (Progressing)       Start:  10/02/24    Expected End:  10/16/24

## 2024-10-03 NOTE — PROGRESS NOTES
"Subjective   Doing well and is without concerns    Overnight Events: None  Objective   Vital Signs:  Blood pressure 154/67, pulse 63, temperature 36.8 °C (98.2 °F), temperature source Temporal, resp. rate 16, height 1.651 m (5' 5\"), weight 77 kg (169 lb 12.1 oz), SpO2 97%.    Physical Exam  Constitutional:       Comments: Elderly gentleman, reduced hearing, oriented x 3,    HENT:      Head: Normocephalic.      Nose: Nose normal.   Eyes:      Extraocular Movements: Extraocular movements intact.      Pupils: Pupils are equal, round, and reactive to light.   Cardiovascular:      Rate and Rhythm: Normal rate and regular rhythm.      Heart sounds: Normal heart sounds. No murmur heard.  Pulmonary:      Effort: No respiratory distress.      Breath sounds: Normal breath sounds. No wheezing.   Abdominal:      General: There is no distension.      Palpations: Abdomen is soft.      Tenderness: There is no abdominal tenderness.   Musculoskeletal:         General: Normal range of motion.      Cervical back: Normal range of motion.   Skin:     General: Skin is warm.      Comments: Bruising noted around the right eye,    Neurological:      General: No focal deficit present.      Mental Status: He is alert and oriented to person, place, and time. Mental status is at baseline.   Psychiatric:         Mood and Affect: Mood normal.     Wt Readings from Last 6 Encounters:   10/02/24 77 kg (169 lb 12.1 oz)   09/11/24 78.3 kg (172 lb 9.6 oz)   08/06/24 79 kg (174 lb 3.2 oz)   07/23/24 79.6 kg (175 lb 8 oz)   05/15/24 78.9 kg (174 lb)   04/29/24 78.9 kg (173 lb 14.4 oz)       I/Os    Intake/Output Summary (Last 24 hours) at 10/3/2024 1934  Last data filed at 10/3/2024 1800  Gross per 24 hour   Intake 560 ml   Output --   Net 560 ml       Labs:   Results for orders placed or performed during the hospital encounter of 10/01/24 (from the past 24 hour(s))   POCT GLUCOSE   Result Value Ref Range    POCT Glucose 237 (H) 74 - 99 mg/dL   CBC "   Result Value Ref Range    WBC 7.7 4.4 - 11.3 x10*3/uL    nRBC 0.0 0.0 - 0.0 /100 WBCs    RBC 3.07 (L) 4.50 - 5.90 x10*6/uL    Hemoglobin 10.0 (L) 13.5 - 17.5 g/dL    Hematocrit 29.8 (L) 41.0 - 52.0 %    MCV 97 80 - 100 fL    MCH 32.6 26.0 - 34.0 pg    MCHC 33.6 32.0 - 36.0 g/dL    RDW 11.8 11.5 - 14.5 %    Platelets 192 150 - 450 x10*3/uL   Comprehensive metabolic panel   Result Value Ref Range    Glucose 107 (H) 74 - 99 mg/dL    Sodium 140 136 - 145 mmol/L    Potassium 3.8 3.5 - 5.3 mmol/L    Chloride 110 (H) 98 - 107 mmol/L    Bicarbonate 24 21 - 32 mmol/L    Anion Gap 10 10 - 20 mmol/L    Urea Nitrogen 25 (H) 6 - 23 mg/dL    Creatinine 1.29 0.50 - 1.30 mg/dL    eGFR 50 (L) >60 mL/min/1.73m*2    Calcium 9.0 8.6 - 10.3 mg/dL    Albumin 3.3 (L) 3.4 - 5.0 g/dL    Alkaline Phosphatase 39 33 - 136 U/L    Total Protein 5.5 (L) 6.4 - 8.2 g/dL    AST 18 9 - 39 U/L    Bilirubin, Total 0.5 0.0 - 1.2 mg/dL    ALT 15 10 - 52 U/L   POCT GLUCOSE   Result Value Ref Range    POCT Glucose 114 (H) 74 - 99 mg/dL   POCT GLUCOSE   Result Value Ref Range    POCT Glucose 142 (H) 74 - 99 mg/dL   POCT GLUCOSE   Result Value Ref Range    POCT Glucose 139 (H) 74 - 99 mg/dL       Imaging:  Vascular US carotid artery duplex bilateral    Result Date: 10/3/2024             [place] [address] Phone [phone] [phone], Fax [phone]  Vascular Lab Report  VASC US CAROTID ARTERY DUPLEX BILATERAL Patient Name:      REBA Cole Physician:  [id number] Lisa Zambrano MD Study Date:        10/2/2024            Ordering Provider:  [id number] ALEX TOSCANO MRN/PID:           [id number]             Fellow: Accession#:        [id number]         Technologist:       Harrison Alexander RVT Date of Birth/Age: 7/25/1926 / 89 years Technologist 2: Gender:            M                     Encounter#:         [id number] Admission Status:  Inpatient            Location Performed: [facility]  Diagnosis/ICD: Other specified symptoms and signs involving the circulatory and                respiratory systems-R09.89; Syncope and collapse-R55 CPT Codes:     43766 Cerebrovascular Carotid Duplex scan complete  CONCLUSIONS: Right Carotid: Findings are consistent with less than 50% stenosis of the right proximal internal carotid artery. Laminar flow seen by color Doppler. There are elevated velocities in the right ECA that are suggestive of disease. No evidence of hemodynamically significant stenosis of the right common carotid artery. The right vertebral artery is patent with antegrade flow. No evidence of hemodynamically significant stenosis in the right subclavian artery. Left Carotid: Findings are consistent with less than 50% stenosis of the left proximal internal carotid artery. Laminar flow seen by color Doppler. There are elevated velocities in the left ECA that are suggestive of disease. No evidence of hemodynamically significant stenosis of the left common carotid artery. The left vertebral artery is patent with antegrade flow. There are elevated velocities in the left subclavian artery that are suggestive of disease.  Imaging & Doppler Findings: Right Plaque Morph: The proximal right internal carotid artery demonstrates irregular and heterogenous plaque. The proximal right external carotid artery demonstrates calcified and shadowing plaque. The distal right common carotid artery demonstrates irregular and heterogenous plaque. The right carotid bulb demonstrates irregular and heterogenous plaque. Left Plaque Morph: The proximal left internal carotid artery demonstrates calcified and shadowing plaque. The proximal left external carotid artery demonstrates calcified and shadowing plaque. The proximal left common carotid artery demonstrates heterogenous plaque. The mid left common  carotid artery demonstrates heterogenous plaque. The distal left common carotid artery demonstrates heterogenous plaque.   Right                        Left   PSV      EDV                PSV       cm/s           CCA P    118 cm/s 140 cm/s           CCA D    141 cm/s 155 cm/s 20 cm/s   ICA P    162 cm/s 16 cm/s 131 cm/s 21 cm/s   ICA D    156 cm/s 31 cm/s 292 cm/s            ECA     174 cm/s 65 cm/s  11 cm/s Vertebral  159 cm/s 13 cm/s 160 cm/s         Subclavian 354 cm/s                Right Left ICA/CCA Ratio  1.1  1.1   [id number] Lisa Zambrano MD Electronically signed by [id number] Lisa Zambrano MD on 10/3/2024 at 2:29:31 PM  ** Final **     ECG 12 lead    Result Date: 10/2/2024  Sinus rhythm with 1st degree AV block Left ventricular hypertrophy with repolarization abnormality ( R in aVL ) Abnormal ECG No previous ECGs available See ED provider note for full interpretation and clinical correlation Confirmed by Li Lott ([id number]) on 10/2/2024 10:11:04 AM    Transthoracic Echo (TTE) Complete    Result Date: 10/2/2024             [place] [address] Phone [phone] ext-[phone], Fax [phone] TRANSTHORACIC ECHOCARDIOGRAM REPORT Patient Name:      REBA VALERIE ROSE         Reading Physician:    [id number] Adria Patrick MD Study Date:        10/2/2024             Ordering Provider:    [id number] ALEX TOSCANO MRN/PID:           [id number]              Fellow: Accession#:        [id number]          Nurse:                Lupe Mann RN Date of Birth/Age: 7/25/1926 / 89 years  Sonographer:          Indigo Boyd RVT, RCS Gender:            M                     Additional Staff: Height:            162.56 cm             Admit Date:           10/1/2024 Weight:             76.66 kg              Admission Status:     Inpatient -                                                                Routine BSA / BMI:         1.82 m2 / 29.01 kg/m2 Department Location:  [address] Blood Pressure: 132 /70 mmHg Study Type:    TRANSTHORACIC ECHO (TTE) COMPLETE Diagnosis/ICD: Dizziness and giddiness-R42 Indication:    Dizziness,Near Syncope CPT Codes:     Echo Complete w Full Doppler-02775 Patient History: Pertinent History: No previous echo. Study Detail: The following Echo studies were performed: 2D, M-Mode, Doppler and               color flow. Definity used as a contrast agent for endocardial               border definition. Total contrast used for this procedure was 2 mL               via IV push. A bubble study was not performed. The patient was               awake.  PHYSICIAN INTERPRETATION: Left Ventricle: Left ventricular ejection fraction is normal, by visual estimate at 55-60%. There are no regional wall motion abnormalities. The left ventricular cavity size is normal. There is moderate asymmetric left ventricular hypertrophy involving the basal wall. Spectral Doppler shows a pseudonormal pattern of left ventricular diastolic filling. Left Atrium: The left atrium is normal in size. A bubble study using agitated saline was not performed. Right Ventricle: The right ventricle is normal in size. There is reduced right ventricular systolic function. Right Atrium: The right atrium is normal in size. Aortic Valve: The aortic valve is trileaflet. The aortic valve dimensionless index is 0.89. There is no evidence of aortic valve regurgitation. The peak instantaneous gradient of the aortic valve is 11.0 mmHg. The mean gradient of the aortic valve is 6.0 mmHg. Mitral Valve: The mitral valve is abnormal. There is mild mitral annular calcification. There is mild mitral valve regurgitation. Tricuspid Valve: The tricuspid valve is structurally normal. There is moderate tricuspid regurgitation. RV-RA  gradient 46 mmHg. Pulmonic Valve: The pulmonic valve is not well visualized. There is mild pulmonic valve regurgitation. Pericardium: No pericardial effusion noted. Aorta: The aortic root is normal. Pulmonary Artery: The Doppler estimated pulmonary artery diastolic pressure is 9.2 mmHg.  CONCLUSIONS:  1. Left ventricular ejection fraction is normal, by visual estimate at 55-60%.  2. Spectral Doppler shows a pseudonormal pattern of left ventricular diastolic filling.  3. There is moderate asymmetric left ventricular hypertrophy.  4. There is reduced right ventricular systolic function.  5. Moderate tricuspid regurgitation.  6. Elevated pulmonary artery systolic pressure suggestive of pulmonary hypertension. QUANTITATIVE DATA SUMMARY:  2D MEASUREMENTS:           Normal Ranges: Ao Root d:       2.70 cm   (2.0-3.7cm) LAs:             3.30 cm   (2.7-4.0cm) IVSd:            1.75 cm   (0.6-1.1cm) LVPWd:           1.24 cm   (0.6-1.1cm) LVIDd:           3.20 cm   (3.9-5.9cm) LVIDs:           1.98 cm LV Mass Index:   93.7 g/m2 LV % FS          38.1 %  LA VOLUME:                    Normal Ranges: LA Vol A4C:        40.3 ml    (22+/-6mL/m2) LA Vol A2C:        37.8 ml LA Vol BP:         40.1 ml LA Vol Index A4C:  22.1ml/m2 LA Vol Index A2C:  20.8 ml/m2 LA Vol Index BP:   22.0 ml/m2 LA Area A4C:       15.6 cm2 LA Area A2C:       14.7 cm2 LA Major Axis A4C: 5.1 cm LA Major Axis A2C: 4.9 cm LA Volume Index:   20.2 ml/m2 LA Vol A4C:        39.9 ml LA Vol A2C:        36.7 ml LA Vol Index BSA:  21.0 ml/m2  M-MODE MEASUREMENTS:         Normal Ranges: AoV Exc:             1.10 cm (1.5-2.5cm)  AORTA MEASUREMENTS:         Normal Ranges: AoV Exc:            1.10 cm (1.5-2.5cm)  LV SYSTOLIC FUNCTION BY 2D PLANIMETRY (MOD):                      Normal Ranges: EF-A4C View:    58 % (>=55%) EF-A2C View:    51 % EF-Biplane:     54 % EF-Visual:      58 % LV EF Reported: 58 %  LV DIASTOLIC FUNCTION:           Normal Ranges: MV Peak E:              1.40 m/s  (0.7-1.2 m/s) MV Peak A:             1.24 m/s  (0.42-0.7 m/s) E/A Ratio:             1.13      (1.0-2.2) MV e'                  0.070 m/s (>8.0) MV lateral e'          0.07 m/s MV medial e'           0.07 m/s E/e' Ratio:            19.96     (<8.0)  MITRAL VALVE:          Normal Ranges: MV DT:        296 msec (150-240msec)  MITRAL INSUFFICIENCY:             Normal Ranges: MR Vmax:              448.00 cm/s  AORTIC VALVE:                      Normal Ranges: AoV Vmax:                1.66 m/s  (<=1.7m/s) AoV Peak P.0 mmHg (<20mmHg) AoV Mean P.0 mmHg  (1.7-11.5mmHg) LVOT Max Ernesto:            1.49 m/s  (<=1.1m/s) AoV VTI:                 42.40 cm  (18-25cm) LVOT VTI:                37.60 cm LVOT Diameter:           1.70 cm   (1.8-2.4cm) AoV Area, VTI:           2.01 cm2  (2.5-5.5cm2) AoV Area,Vmax:           2.04 cm2  (2.5-4.5cm2) AoV Dimensionless Index: 0.89  RIGHT VENTRICLE: RV Basal 3.47 cm RV Mid   2.14 cm RV Major 5.2 cm TAPSE:   11.7 mm  TRICUSPID VALVE/RVSP:          Normal Ranges: Peak TR Velocity:     3.39 m/s RV Syst Pressure:     49 mmHg  (< 30mmHg)  PULMONIC VALVE:          Normal Ranges: PV Accel Time:  102 msec (>120ms) PV Max Ernesto:     1.3 m/s  (0.6-0.9m/s) PV Max P.1 mmHg PIEDV:          1.25 m/s PADP:           9.2 mmHg  [id number] Adria Patrick MD Electronically signed on 10/2/2024 at 9:48:50 AM  ** Final **     MR brain wo IV contrast    Result Date: 10/2/2024  Interpreted By:  Marcelo Reyes, STUDY: MR BRAIN WO IV CONTRAST; MR ANGIO HEAD WO IV CONTRAST;  10/2/2024 8:00 am   INDICATION: Signs/Symptoms:rule out CVA.  Stroke protocol.     Signs/Symptoms:rule out CVA.  Fell, striking head.   COMPARISON: 10/01/2024 brain CT   ACCESSION NUMBER(S): [id number]; [id number]   ORDERING CLINICIAN: ALEX TOSCANO   TECHNIQUE: Axial T2, FLAIR, DWI, gradient echo T2 and  sagittal and coronal T1 weighted images of brain were acquired.   Time-of-flight MRA of the head  was performed. The images were reviewed as source images and maximum intensity projections.   FINDINGS: Brain:   CSF Spaces: The ventricles and sulci are prominent but normal for the patient's age, unchanged.   Parenchyma: The left occipital lucency noted laterally on the CT has faint restricted diffusion with hyperintensity on the T2/FLAIR images. A small focus of gradient echo hypointensity is also present along the cortex suggestive of petechial hemorrhage.   The white matter and stephanie have a few focal nonspecific FLAIR hyperintensities.   Otherwise no mass or hemorrhage is noted.   Paranasal Sinuses and Mastoids: Visualized paranasal sinuses and mastoid air cells are unremarkable.   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 There is expected flow signal in bilateral intracranial internal carotid arteries, bilateral carotid terminals, bilateral proximal anterior and middle cerebral arteries.   Posterior circulation:    The right posterior cerebral artery has multiple moderate to severe stenoses in the P1 and more distal P3 segments. The intradural segment of the right vertebral artery has mild less than 40% stenosis. No basilar artery stenoses or stenoses of the left posterior cerebral artery are noted.       MRI Brain:   1. The lateral left occipital lobe has an area subacute ischemia with punctate petechial hemorrhage along the cortex corresponding to the lucency on the C T.   2. The parenchymal hyperintensities elsewhere may be secondary to chronic microvascular ischemic changes among others.   MRA:   The right posterior cerebral artery has multiple foci moderate to severe stenoses. No stenoses of the left posterior cerebral artery are noted.   MACRO: Critical Finding:  See findings. Notification was initiated on 10/2/2024  at 9:32 am by  Marcelo Reyes.  (**-OCF-**)   Signed by: Marcelo Reyes 10/2/2024 9:32 AM Dictation workstation:   [id number]    MR angio head wo IV contrast    Result Date: 10/2/2024  Interpreted By:  Marcelo Reyes, STUDY: MR BRAIN WO IV CONTRAST; MR ANGIO HEAD WO IV CONTRAST;  10/2/2024 8:00 am   INDICATION: Signs/Symptoms:rule out CVA.  Stroke protocol.     Signs/Symptoms:rule out CVA.  Fell, striking head.   COMPARISON: 10/01/2024 brain CT   ACCESSION NUMBER(S): [id number]; [id number]   ORDERING CLINICIAN: ALEX TOSCANO   TECHNIQUE: Axial T2, FLAIR, DWI, gradient echo T2 and  sagittal and coronal T1 weighted images of brain were acquired.   Time-of-flight MRA of the head was performed. The images were reviewed as source images and maximum intensity projections.   FINDINGS: Brain:   CSF Spaces: The ventricles and sulci are prominent but normal for the patient's age, unchanged.   Parenchyma: The left occipital lucency noted laterally on the CT has faint restricted diffusion with hyperintensity on the T2/FLAIR images. A small focus of gradient echo hypointensity is also present along the cortex suggestive of petechial hemorrhage.   The white matter and stephanie have a few focal nonspecific FLAIR hyperintensities.   Otherwise no mass or hemorrhage is noted.   Paranasal Sinuses and Mastoids: Visualized paranasal sinuses and mastoid air cells are unremarkable.   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 There is expected flow signal in bilateral intracranial internal carotid arteries, bilateral carotid terminals, bilateral proximal anterior and middle cerebral arteries.   Posterior circulation:    The right posterior cerebral artery has multiple moderate to severe stenoses in the P1 and more distal P3 segments. The intradural segment  of the right vertebral artery has mild less than 40% stenosis. No basilar artery stenoses or stenoses of the left posterior cerebral artery are noted.       MRI Brain:   1. The lateral left occipital lobe has an area subacute ischemia with punctate petechial hemorrhage along the cortex corresponding to the lucency on the C T.   2. The parenchymal hyperintensities elsewhere may be secondary to chronic microvascular ischemic changes among others.   MRA:   The right posterior cerebral artery has multiple foci moderate to severe stenoses. No stenoses of the left posterior cerebral artery are noted.   MACRO: Critical Finding:  See findings. Notification was initiated on 10/2/2024 at 9:32 am by  Marcelo Reyes.  (**-OCF-**)   Signed by: Marcelo Reyes 10/2/2024 9:32 AM Dictation workstation:   [id number]    CT chest abdomen pelvis w IV contrast    Result Date: 10/1/2024  Interpreted By:  Jacky Diallo, STUDY: CT CHEST ABDOMEN PELVIS W IV CONTRAST;  10/1/2024 8:34 pm   INDICATION: Signs/Symptoms:trauma = pneumothorax on CT   COMPARISON: None.   ACCESSION NUMBER(S): [id number]   ORDERING CLINICIAN: JORDAN COOPER   TECHNIQUE: CT of the chest, abdomen, and pelvis was performed. Contiguous axial images were obtained through the chest, abdomen and pelvis. Coronal and sagittal reconstructions were performed.   The images were obtained in the late arterial and portal venous phases.   90 ml of contrast material Omnipaque 350 were administered intravenously without immediate complication.   FINDINGS: CHEST:   LUNG/PLEURA/LARGE AIRWAYS: There are no pleural effusions. Pneumothorax is not demonstrated on chest CT. There are no consolidations. The tracheobronchial tree is patent. Mild dependent atelectasis.   VESSELS: No traumatic aortic injury is appreciated within the limitations of this non-EKG gated study.  The thoracic aorta is of normal course and caliber.   Main pulmonary artery and its branches are normal in caliber.   HEART:  The heart is normal in size.   There is no pericardial effusion. Heavy coronary artery atherosclerotic calcifications.   MEDIASTINUM AND NATIVIDAD: No pneumomediastinum, abnormal mediastinal fluid collection or mediastinal hematoma is appreciated. No mediastinal, hilar or axillary adenopathy is present.  The esophagus is grossly normal.   CHEST WALL AND LOWER NECK: No acute fracture or dislocation of the included osseous structures are appreciated.  No suspicious osseous lesions are identified.  The thoracic wall soft tissues are within normal limits.   ABDOMEN:   LIVER: The liver is normal in size and contour. There is no subcapsular hematoma, no perihepatic fluid collection.  There are no suspicious hepatic lesions.   GALLBLADDER: The gallbladder is nondistended, without evidence of radiopaque stone.   BILE DUCTS: The intahepatic and extrahepatic bile ducts are not dilated.   PANCREAS: The pancreas appears unremarkable.   SPLEEN: No parenchymal perfusion deficit of the spleen is appreciated to suggest contusion or laceration. There is no subcapsular hematoma, no perisplenic fluid collection.   ADRENAL GLANDS: The bilateral adrenal glands are unremarkable in appearance.   KIDNEYS AND URETERS: Normal size and enhancement. No hydronephrosis or obstructive nephrolithiasis. There are bilateral intrarenal vascular atherosclerotic calcifications. Multiple bilateral renal cysts with exophytic cyst arising from the left lower pole kidney measuring 9.0 x 9.5 cm.   PELVIS:   BLADDER: The urinary bladder is grossly normal.   REPRODUCTIVE ORGANS: Marked prostatomegaly with prostate measuring 6.2 x 5.9 cm axially.   BOWEL: Moderate hiatal hernia. The stomach is otherwise grossly normal. The small bowel is normal in caliber without evidence of focal wall thickening or obstruction.  There is no evidence of focal wall thickening or dilatation of the large bowel.  Normal appendix. Postsurgical change of sigmoid resection and  anastomosis.   VESSELS: The aorta and IVC are normal in caliber. Circumferential atherosclerotic plaque of the abdominal aorta and its branches.. The principal vasculature of the abdomen and pelvis is patent.   PERITONEUM/RETROPERITONEUM/LYMPH NODES: There is no evidence of intra- or retroperitoneal hematoma.  There is no free or loculated fluid collection, no free intraperitoneal air. No abdominopelvic lymphadenopathy is present.   BONES AND ABDOMINAL WALL: No evidence of acute fracture or dislocation of the included osseous structures. Vertebral bodies are intact. Mild degenerative anterolisthesis of L4 over L5 with bilateral moderate foraminal stenosis at L4-L5 and L5-S1. No high-grade central canal stenosis. No suspicious osseous lesions are identified.  The abdominal wall soft tissues appear normal.       CHEST 1.  No evidence of acute traumatic injury in the chest. Heavy coronary artery atherosclerotic calcifications.   ABDOMEN - PELVIS 1.  No evidence of acute traumatic abnormality in the abdomen/pelvis. 2. Moderate hiatal hernia with fluid within the hiatal hernia suggestive of esophageal reflux. Marked prostatomegaly. Bilateral renal cysts with largest exophytic left renal cyst measuring 9.5 cm.     Signed by: Jacky Diallo 10/1/2024 9:36 PM Dictation workstation:   [id number]     Medications:    Current Facility-Administered Medications:   •  acetaminophen (Tylenol) tablet 650 mg, 650 mg, oral, q4h PRN **OR** acetaminophen (Tylenol) oral liquid 650 mg, 650 mg, oral, q4h PRN **OR** acetaminophen (Tylenol) suppository 650 mg, 650 mg, rectal, q4h PRN, Saul Johnson MD  •  amLODIPine (Norvasc) tablet 10 mg, 10 mg, oral, Daily, Yovani Lujan DO, 10 mg at 10/03/24 1027  •  aspirin EC tablet 81 mg, 81 mg, oral, Daily, Saul Johnson MD, 81 mg at 10/03/24 0853  •  atorvastatin (Lipitor) tablet 40 mg, 40 mg, oral, Nightly, Saul Johnson MD, 40 mg at 10/02/24 2051  •  dextrose 50 % injection 12.5 g,  12.5 g, intravenous, q15 min PRN, Saul Johnson MD  •  dextrose 50 % injection 25 g, 25 g, intravenous, q15 min PRN, Saul Johnson MD  •  finasteride (Proscar) tablet 5 mg, 5 mg, oral, Daily, Saul Johnson MD, 5 mg at 10/03/24 0853  •  flu vaccine, trivalent, preservative free, HIGH-DOSE, age 65y+ (Fluzone), 0.5 mL, intramuscular, During hospitalization, Saul Johnson MD  •  glucagon (Glucagen) injection 1 mg, 1 mg, intramuscular, q15 min PRN, Saul Johnson MD  •  glucagon (Glucagen) injection 1 mg, 1 mg, intramuscular, q15 min PRN, Saul Johnson MD  •  hydroCHLOROthiazide (Microzide) capsule 12.5 mg, 12.5 mg, oral, Daily, Yovani Lujan DO, 12.5 mg at 10/03/24 1027  •  hydrOXYzine HCL (Atarax) tablet 10 mg, 10 mg, oral, BID PRN, Yovani Lujan DO  •  insulin lispro (HumaLOG) injection 0-10 Units, 0-10 Units, subcutaneous, Before meals & nightly, Saul Johnson MD, 4 Units at 10/02/24 2051  •  isosorbide mononitrate ER (Imdur) 24 hr tablet 30 mg, 30 mg, oral, Daily, Saul Johnson MD, 30 mg at 10/03/24 0853  •  labetaloL (Normodyne,Trandate) injection 10 mg, 10 mg, intravenous, q10 min PRN, Saul Johnson MD  •  levothyroxine (Synthroid, Levoxyl) tablet 137 mcg, 137 mcg, oral, Daily, Saul Johnson MD, 137 mcg at 10/03/24 0853  •  lubricating eye drops ophthalmic solution 1 drop, 1 drop, Both Eyes, BID PRN, Saul Johnson MD  •  magnesium hydroxide (Milk of Magnesia) 2,400 mg/10 mL suspension 10 mL, 10 mL, oral, Daily PRN, Saul Johnson MD  •  ondansetron ODT (Zofran-ODT) disintegrating tablet 4 mg, 4 mg, oral, q8h PRN **OR** ondansetron (Zofran) injection 4 mg, 4 mg, intravenous, q8h PRN, Saul Johnson MD  •  oxygen (O2) therapy, , inhalation, Continuous PRN - O2/gases, Saul Johnson MD, 21 percent at 10/03/24 1300  •  perflutren protein A microsphere (Optison) injection 0.5 mL, 0.5 mL, intravenous, Once in imaging, Saul Johnson MD  •  sulfur hexafluoride  microsphr (Lumason) injection 24.28 mg, 2 mL, intravenous, Once in imaging, Saul Johnson MD    Assessment & Plan    89-year-old gentleman with PMH significant for hypertension, anxiety, BPH, CAD, diabetes mellitus, hyperlipidemia, hypothyroidism, lower extremities edema on Lasix, kidney stones, insomnia, renal cysts, hiatal hernia, who was brought by EMS to the [facility] c/o mechanical fall while walking at a retail store.  On presentation, patient was found to have a hypertensive urgency. CT of the head showed a focal loss of gray-white differentiation and hypoattenuation of the left occipital lobe that could reflect an acute infarct.   CT of the upper chest showed a trace right apical pneumothorax.MRI showed suspected subacute infarct in left occipital lobe with punctate hemorrhage.      Subacute left occipital infarct::  Case discussed with Neurologist at [facility]  Images reviewed by Neurology  Possible sub acute ischemia  Continue Aspirin 81 mg daily  Lipitor 40 mg daily     Mechanical Fall:   Monitor Orthostatics  PT, OT     H/o Hypertension:  Start amlodipine 10 mg + HCTZ 12.5 mg. Lisinopril stopped.  Continue Imdur  Monitor     Dispo:  As per PT, OT.  Patient prefers home with [facility]. Aim for home tomorrow.     Yovani Lujan, DO  Internal Medicine

## 2024-10-03 NOTE — PROGRESS NOTES
Spiritual Care Visit    Clinical Encounter Type  Visited With: Patient, Patient and family together  Routine Visit: Introduction  Continue Visiting: Yes         Values/Beliefs  Spiritual Requests During Hospitalization: Scriptue and prayer         Patient Spiritual Care Encounters  Suffering Severity: None  Fear Level: None  Feelings of Loneliness: Excellent  Feelings of Hopelessness: Excellent  Coping: Consistently demonstrated  Dignified Life Closure: Consistently demonstrated    Family Spiritual Care Encounters  Family Coping: Accepting  Family Participation in Care: Consistently demonstrated  Family Support During Treatment: Consistently demonstrated  Caregiver-Patient Relationship: Not compromised         PC-7 Assessment (Level of Unmet Needs)  Existential Struggle: None  Spiritual/Advent Struggle: None  Legacy: None  Relationships: None  Fear of Death/Dying: None  Values/Medical Decision Making: None  Ritual/Other: None  PC-7 Score: 0    SDAT (Spiritual Distress Assessment Tool)  Need for Life Balance: No evidence of unmet spiritual need  Need for Connection: No evidence of unmet spiritual need  Need for Values Acknowledgement: No evidence of unmet spiritual need  Need to Maintain Control: No evidence of unmet spiritual need  Need to Maintain Identity: No evidence of unmet spiritual need  SDAT Score: 0  SDAT Average Score: 0    Taxonomy  Intended Effects: Aligning care plan with patient's values, Build relationship of care and support, Demonstrate caring and concern  Methods: Collaborate with care team member, Encourage sharing of feelings  Interventions: Acknowledge current situation, Acknowledge response to difficult experience

## 2024-10-03 NOTE — CARE PLAN
The patient's goals for the shift include use the call light    The clinical goals for the shift include Monitor labs and v/s.    Problem: Skin  Goal: Decreased wound size/increased tissue granulation at next dressing change  Flowsheets (Taken 10/3/2024 0052)  Decreased wound size/increased tissue granulation at next dressing change: Promote sleep for wound healing     Problem: Fall/Injury  Goal: Not fall by end of shift  Outcome: Progressing     Problem: General Stroke  Goal: Out of bed three times today  Outcome: Progressing

## 2024-10-04 ENCOUNTER — PHARMACY VISIT (OUTPATIENT)
Dept: PHARMACY | Facility: CLINIC | Age: 89
End: 2024-10-04
Payer: COMMERCIAL

## 2024-10-04 ENCOUNTER — HOME HEALTH ADMISSION (OUTPATIENT)
Dept: HOME HEALTH SERVICES | Facility: HOME HEALTH | Age: 89
End: 2024-10-04
Payer: MEDICARE

## 2024-10-04 ENCOUNTER — DOCUMENTATION (OUTPATIENT)
Dept: HOME HEALTH SERVICES | Facility: HOME HEALTH | Age: 89
End: 2024-10-04
Payer: MEDICARE

## 2024-10-04 VITALS
SYSTOLIC BLOOD PRESSURE: 138 MMHG | OXYGEN SATURATION: 97 % | HEIGHT: 65 IN | RESPIRATION RATE: 17 BRPM | WEIGHT: 169.75 LBS | TEMPERATURE: 96.8 F | DIASTOLIC BLOOD PRESSURE: 66 MMHG | HEART RATE: 70 BPM | BODY MASS INDEX: 28.28 KG/M2

## 2024-10-04 PROBLEM — S09.90XA HEAD INJURY, INITIAL ENCOUNTER: Status: RESOLVED | Noted: 2024-10-01 | Resolved: 2024-10-04

## 2024-10-04 PROBLEM — I16.0 HYPERTENSIVE URGENCY: Status: RESOLVED | Noted: 2024-10-04 | Resolved: 2024-10-04

## 2024-10-04 PROBLEM — I16.0 HYPERTENSIVE URGENCY: Status: ACTIVE | Noted: 2024-10-04

## 2024-10-04 LAB
ALBUMIN SERPL BCP-MCNC: 3.7 G/DL (ref 3.4–5)
ANION GAP SERPL CALC-SCNC: 10 MMOL/L (ref 10–20)
BUN SERPL-MCNC: 26 MG/DL (ref 6–23)
CALCIUM SERPL-MCNC: 9.1 MG/DL (ref 8.6–10.3)
CHLORIDE SERPL-SCNC: 107 MMOL/L (ref 98–107)
CO2 SERPL-SCNC: 25 MMOL/L (ref 21–32)
CREAT SERPL-MCNC: 1.28 MG/DL (ref 0.5–1.3)
EGFRCR SERPLBLD CKD-EPI 2021: 51 ML/MIN/1.73M*2
ERYTHROCYTE [DISTWIDTH] IN BLOOD BY AUTOMATED COUNT: 11.4 % (ref 11.5–14.5)
GLUCOSE BLD MANUAL STRIP-MCNC: 104 MG/DL (ref 74–99)
GLUCOSE BLD MANUAL STRIP-MCNC: 196 MG/DL (ref 74–99)
GLUCOSE SERPL-MCNC: 108 MG/DL (ref 74–99)
HCT VFR BLD AUTO: 32.5 % (ref 41–52)
HGB BLD-MCNC: 11 G/DL (ref 13.5–17.5)
MAGNESIUM SERPL-MCNC: 2.02 MG/DL (ref 1.6–2.4)
MCH RBC QN AUTO: 32.4 PG (ref 26–34)
MCHC RBC AUTO-ENTMCNC: 33.8 G/DL (ref 32–36)
MCV RBC AUTO: 96 FL (ref 80–100)
NRBC BLD-RTO: 0 /100 WBCS (ref 0–0)
PHOSPHATE SERPL-MCNC: 2.8 MG/DL (ref 2.5–4.9)
PLATELET # BLD AUTO: 216 X10*3/UL (ref 150–450)
POTASSIUM SERPL-SCNC: 3.8 MMOL/L (ref 3.5–5.3)
RBC # BLD AUTO: 3.39 X10*6/UL (ref 4.5–5.9)
SODIUM SERPL-SCNC: 138 MMOL/L (ref 136–145)
WBC # BLD AUTO: 7.4 X10*3/UL (ref 4.4–11.3)

## 2024-10-04 PROCEDURE — 2500000005 HC RX 250 GENERAL PHARMACY W/O HCPCS: Performed by: INTERNAL MEDICINE

## 2024-10-04 PROCEDURE — 85027 COMPLETE CBC AUTOMATED: CPT | Performed by: STUDENT IN AN ORGANIZED HEALTH CARE EDUCATION/TRAINING PROGRAM

## 2024-10-04 PROCEDURE — 83735 ASSAY OF MAGNESIUM: CPT | Performed by: STUDENT IN AN ORGANIZED HEALTH CARE EDUCATION/TRAINING PROGRAM

## 2024-10-04 PROCEDURE — 2500000002 HC RX 250 W HCPCS SELF ADMINISTERED DRUGS (ALT 637 FOR MEDICARE OP, ALT 636 FOR OP/ED): Performed by: INTERNAL MEDICINE

## 2024-10-04 PROCEDURE — RXMED WILLOW AMBULATORY MEDICATION CHARGE

## 2024-10-04 PROCEDURE — 2500000001 HC RX 250 WO HCPCS SELF ADMINISTERED DRUGS (ALT 637 FOR MEDICARE OP): Performed by: INTERNAL MEDICINE

## 2024-10-04 PROCEDURE — 99239 HOSP IP/OBS DSCHRG MGMT >30: CPT | Performed by: STUDENT IN AN ORGANIZED HEALTH CARE EDUCATION/TRAINING PROGRAM

## 2024-10-04 PROCEDURE — 94761 N-INVAS EAR/PLS OXIMETRY MLT: CPT

## 2024-10-04 PROCEDURE — 2500000001 HC RX 250 WO HCPCS SELF ADMINISTERED DRUGS (ALT 637 FOR MEDICARE OP): Performed by: STUDENT IN AN ORGANIZED HEALTH CARE EDUCATION/TRAINING PROGRAM

## 2024-10-04 PROCEDURE — 80069 RENAL FUNCTION PANEL: CPT | Performed by: STUDENT IN AN ORGANIZED HEALTH CARE EDUCATION/TRAINING PROGRAM

## 2024-10-04 PROCEDURE — 82947 ASSAY GLUCOSE BLOOD QUANT: CPT

## 2024-10-04 PROCEDURE — 36415 COLL VENOUS BLD VENIPUNCTURE: CPT | Performed by: STUDENT IN AN ORGANIZED HEALTH CARE EDUCATION/TRAINING PROGRAM

## 2024-10-04 RX ORDER — ASPIRIN 81 MG/1
81 TABLET ORAL DAILY
Qty: 30 TABLET | Refills: 2 | Status: SHIPPED | OUTPATIENT
Start: 2024-10-05 | End: 2025-01-03

## 2024-10-04 RX ORDER — METFORMIN HYDROCHLORIDE 500 MG/1
500 TABLET, EXTENDED RELEASE ORAL
Qty: 30 TABLET | Refills: 2 | Status: SHIPPED | OUTPATIENT
Start: 2024-10-04 | End: 2025-01-02

## 2024-10-04 RX ORDER — CHLORTHALIDONE 25 MG/1
25 TABLET ORAL EVERY MORNING
Qty: 30 TABLET | Refills: 2 | Status: SHIPPED | OUTPATIENT
Start: 2024-10-04 | End: 2025-01-02

## 2024-10-04 RX ORDER — HYDROCHLOROTHIAZIDE 25 MG/1
25 TABLET ORAL DAILY
Status: DISCONTINUED | OUTPATIENT
Start: 2024-10-05 | End: 2024-10-04 | Stop reason: HOSPADM

## 2024-10-04 RX ORDER — HYDROXYZINE HYDROCHLORIDE 10 MG/1
10 TABLET, FILM COATED ORAL 2 TIMES DAILY PRN
Qty: 30 TABLET | Refills: 0 | Status: SHIPPED | OUTPATIENT
Start: 2024-10-04

## 2024-10-04 RX ORDER — ATORVASTATIN CALCIUM 40 MG/1
40 TABLET, FILM COATED ORAL NIGHTLY
Qty: 30 TABLET | Refills: 2 | Status: SHIPPED | OUTPATIENT
Start: 2024-10-04 | End: 2025-01-02

## 2024-10-04 RX ORDER — AMLODIPINE BESYLATE 10 MG/1
10 TABLET ORAL DAILY
Qty: 30 TABLET | Refills: 2 | Status: SHIPPED | OUTPATIENT
Start: 2024-10-05 | End: 2025-01-03

## 2024-10-04 RX ORDER — HYDROCHLOROTHIAZIDE 12.5 MG/1
12.5 CAPSULE ORAL ONCE
Status: COMPLETED | OUTPATIENT
Start: 2024-10-04 | End: 2024-10-04

## 2024-10-04 ASSESSMENT — PAIN SCALES - GENERAL
PAINLEVEL_OUTOF10: 0 - NO PAIN

## 2024-10-04 NOTE — HH CARE COORDINATION
Home Care received a Referral for Nursing, Physical Therapy, and Occupational Therapy. We have processed the referral for a Start of Care on 10/5-10/6.     If you have any questions or concerns, please feel free to contact us at [phone]. Follow the prompts, enter your five digit zip code, and you will be directed to your care team on WEST 3.

## 2024-10-04 NOTE — NURSING NOTE
Discharge Note: 10/4/2024 1401 AVS and pt responsibilities reviewed with pt and copy given. Head injury, wound care, stroke, TIA, BE FAST, education reviewed with pt and information sheets given. Pt verbalizes understanding of instructions received, verbalizes understanding of when to seek medical attention, denies any home going or personal care needs. Denies further questions or concerns. Reviewed follow up appts with pt and verbalizes understanding. Marlen MCCARTY

## 2024-10-04 NOTE — PROGRESS NOTES
Medication Education     Medication education for Carmen Vinson was provided to the patient and family for the following medication(s):  Amlodipine 10 mg daily  Aspirin 81 mg daily  Atorvastatin 40 mg daily  Chlorthalidone 25 mg daily  Hydroxyzine 10 mg BID PRN  Metformin  mg daily      Medication education provided by a Pharmacist:  ADR Counseling Proper dose, indication, possible ADRs How the medication works and benefits of taking it Benefits of taking the medication  Importance of compliance    Identified potential barriers to education:  None    Method(s) of Education:  Verbal Written materials provided and reviewed    An opportunity to ask questions and receive answers was provided.     Assessment of understanding the patient and family:  2= meets goals/outcomes    Additional Notes (if applicable):   Patient participated in the  Meds to Beds program via the [facility]. The above 6 medications were delivered to the patient's bedside by the pharmacist and all question were addressed at that time.   Reasons for changing and stopping medications were discussed with patient and family at the bedside    Bettie Baron, CortneyD

## 2024-10-04 NOTE — CARE PLAN
Problem: Diabetes  Goal: Achieve decreasing blood glucose levels by end of shift  Outcome: Progressing  Goal: Increase stability of blood glucose readings by end of shift  Outcome: Progressing  Goal: Decrease in ketones present in urine by end of shift  Outcome: Progressing  Goal: Maintain electrolyte levels within acceptable range throughout shift  Outcome: Progressing  Goal: Maintain glucose levels >70mg/dl to <250mg/dl throughout shift  Outcome: Progressing  Goal: No changes in neurological exam by end of shift  Outcome: Progressing  Goal: Learn about and adhere to nutrition recommendations by end of shift  Outcome: Progressing  Goal: Vital signs within normal range for age by end of shift  Outcome: Progressing  Goal: Increase self care and/or family involovement by end of shift  Outcome: Progressing  Goal: Receive DSME education by end of shift  Outcome: Progressing     Problem: Pain - Adult  Goal: Verbalizes/displays adequate comfort level or baseline comfort level  Outcome: Progressing     Problem: Safety - Adult  Goal: Free from fall injury  Outcome: Progressing     Problem: Discharge Planning  Goal: Discharge to home or other facility with appropriate resources  Outcome: Progressing     Problem: Chronic Conditions and Co-morbidities  Goal: Patient's chronic conditions and co-morbidity symptoms are monitored and maintained or improved  Outcome: Progressing     Problem: Skin  Goal: Decreased wound size/increased tissue granulation at next dressing change  Outcome: Progressing  Flowsheets (Taken 10/4/2024 1117)  Decreased wound size/increased tissue granulation at next dressing change: Promote sleep for wound healing  Goal: Participates in plan/prevention/treatment measures  Outcome: Progressing  Flowsheets (Taken 10/4/2024 1117)  Participates in plan/prevention/treatment measures: Increase activity/out of bed for meals  Goal: Prevent/manage excess moisture  Outcome: Progressing  Flowsheets (Taken 10/4/2024  1117)  Prevent/manage excess moisture: Moisturize dry skin  Goal: Prevent/minimize sheer/friction injuries  Outcome: Progressing  Flowsheets (Taken 10/4/2024 1117)  Prevent/minimize sheer/friction injuries:   Increase activity/out of bed for meals   Use pull sheet  Goal: Promote/optimize nutrition  Outcome: Progressing  Flowsheets (Taken 10/4/2024 1117)  Promote/optimize nutrition: Consume > 50% meals/supplements  Goal: Promote skin healing  Outcome: Progressing  Flowsheets (Taken 10/2/2024 1130 by Olive Krause RN)  Promote skin healing: Turn/reposition every 2 hours/use positioning/transfer devices     Problem: Fall/Injury  Goal: Not fall by end of shift  Outcome: Progressing  Goal: Be free from injury by end of the shift  Outcome: Progressing  Goal: Verbalize understanding of personal risk factors for fall in the hospital  Outcome: Progressing  Goal: Verbalize understanding of risk factor reduction measures to prevent injury from fall in the home  Outcome: Progressing  Goal: Use assistive devices by end of the shift  Outcome: Progressing  Goal: Pace activities to prevent fatigue by end of the shift  Outcome: Progressing     Problem: General Stroke  Goal: Establish a mutual long term goal with patient by discharge  Outcome: Progressing  Goal: Demonstrate improvement in neurological exam throughout the shift  Outcome: Progressing  Goal: Maintain BP within ordered limits throughout shift  Outcome: Progressing  Goal: Participate in treatment (ie., meds, therapy) throughout shift  Outcome: Progressing  Goal: No symptoms of aspiration throughout shift  Outcome: Progressing  Goal: No symptoms of hemorrhage throughout shift  Outcome: Progressing  Goal: Tolerate enteral feeding throughout shift  Outcome: Progressing  Goal: Decreased nausea/vomiting throughout shift  Outcome: Progressing  Goal: Controlled blood glucose throughout shift  Outcome: Progressing  Goal: Out of bed three times today  Outcome: Progressing

## 2024-10-04 NOTE — NURSING NOTE
Discharge Note: 10/4/2024 1546 Discharged via wheelchair to private car accompanied by PCT, personal belongings taken with pt, no distress noted, no complaints voiced. Marlen MCCARTY

## 2024-10-04 NOTE — PROGRESS NOTES
Pt reviewed in care rounds this morning. Pt medically ready for discharge.  SW met w/ Pt at bedside.  Pt's wife  also present at bedside and Pt gave permission to proceed w/ visit. SW explained IMM and Pt's wife signed per Pt's preference signed, SW left copy w/ Pt @ bedside.  Pt discharged and plan is for Pt to discharge to home w/ wife and [facility]. No further CT/SW assistance needs anticipated.

## 2024-10-07 ENCOUNTER — PATIENT OUTREACH (OUTPATIENT)
Age: 89
End: 2024-10-07
Payer: MEDICARE

## 2024-10-07 NOTE — PROGRESS NOTES
Discharge Facility:[facility]  Discharge Diagnosis:Head injury  Admission Date:10/2/24  Discharge Date:10/4/24    PCP Appointment Date:No contact made task sent to office  Specialist Appointment Date: N/A  Hospital Encounter and Summary Linked: Yes      2 call attempts made

## 2024-10-07 NOTE — DISCHARGE SUMMARY
Discharge Diagnosis  Head injury, initial encounter    Issues Requiring Follow-Up  None    Test Results Pending At Discharge  Pending Labs       No current pending labs.            Hospital Course  89-year-old gentleman with PMH significant for hypertension, anxiety, BPH, CAD, diabetes mellitus, hyperlipidemia, hypothyroidism, lower extremities edema on Lasix, kidney stones, insomnia, renal cysts, hiatal hernia, who was brought by EMS to the [facility] c/o mechanical fall while walking at a retail store.  On presentation, patient was found to have a hypertensive urgency. CT of the head showed a focal loss of gray-white differentiation and hypoattenuation of the left occipital lobe that could reflect an acute infarct.   CT of the upper chest showed a trace right apical pneumothorax.MRI showed suspected subacute infarct in left occipital lobe with punctate hemorrhage.      Subacute left occipital infarct::  Case discussed with Neurologist at [facility]  Images reviewed by Neurology  Possible sub acute ischemia  Continue Aspirin 81 mg daily  Lipitor 40 mg daily     Mechanical Fall:   Monitor Orthostatics  PT, OT     H/o Hypertension:  Continue amlodipine 10 mg + HCTZ 12.5 mg. Lisinopril stopped.  Continue Imdur  Monitor    More than 30 minutes were spent in coordinating patient discharge.    Pertinent Physical Exam At Time of Discharge  Physical Exam  Constitutional:       Comments: Elderly gentleman, reduced hearing, oriented x 3,    HENT:      Head: Normocephalic.      Nose: Nose normal.   Eyes:      Extraocular Movements: Extraocular movements intact.      Pupils: Pupils are equal, round, and reactive to light.   Cardiovascular:      Rate and Rhythm: Normal rate and regular rhythm.      Heart sounds: Normal heart sounds. No murmur heard.  Pulmonary:      Effort: No respiratory distress.      Breath sounds: Normal breath sounds. No wheezing.   Abdominal:      General: There is no distension.      Palpations:  Abdomen is soft.      Tenderness: There is no abdominal tenderness.   Musculoskeletal:         General: Normal range of motion.      Cervical back: Normal range of motion.   Skin:     General: Skin is warm.      Comments: Bruising noted around the right eye,    Neurological:      General: No focal deficit present.      Mental Status: He is alert and oriented to person, place, and time. Mental status is at baseline.   Psychiatric:         Mood and Affect: Mood normal.     Home Medications     Medication List      START taking these medications     amLODIPine 10 mg tablet; Commonly known as: Norvasc; Take 1 tablet (10   mg) by mouth once daily.; Notes to patient: This medicine is being used to   control your blood pressure. It can also be used to help angina. Take at   the same time each day, with or without food. May cause dizziness and   light-headedness. Avoid NSAID's. If you have significant swelling in the   legs, ankle or feet, notify your doctor right away.   aspirin 81 mg EC tablet; Take 1 tablet (81 mg) by mouth once daily.;   Notes to patient: This medicine is a blood thinner that is being used to   help keep your heart healthy by reducing the risk of heart attack and   stroke. Take with food and a full glass of water to lessen the chance of   upset stomach. Avoid other NSAID's and alcohol. Watch for signs and   symptoms of unexpected bleeding or bruising.   atorvastatin 40 mg tablet; Commonly known as: Lipitor; Take 1 tablet (40   mg) by mouth once daily at bedtime.; Notes to patient: This medicine is   being used to help lower cholesterol and reduce the risk of heart attack   and stroke. May cause muscle fatigue and weakness. Take at the same time   each day, with or without food.   chlorthalidone 25 mg tablet; Commonly known as: [place]groton; Take 1 tablet   (25 mg) by mouth once daily in the morning. Start 10/5/2024; Notes to   patient: This medicine is being used to control your blood pressure and to   help  keep extra fluid off of your body. Take first thing in the morning,   with or without food. Avoid NSAID's. May cause you to urinate more often.   May cause dizziness, light-headedness and headache when first starting   therapy. This medicine can make you more sensitive to the sun.    hydrOXYzine HCL 10 mg tablet; Commonly known as: Atarax; Take 1 tablet   (10 mg) by mouth 2 times a day as needed for anxiety.; Notes to patient:   This medication is used to help treat anxiety. It works in the brain to   help produce a calming effect. It can cause dry mouth and drowsiness. To   help reduce the dry mouth, you can try sucking on hard candies or using   over the counters such as biotene.    metFORMIN  mg 24 hr tablet; Commonly known as: Glucophage-XR; Take   1 tablet (500 mg) by mouth once daily with breakfast. Do not crush, chew,   or split.     CONTINUE taking these medications     calcium carbonate 260 mg calcium (648 mg) tablet tablet   finasteride 5 mg tablet; Commonly known as: Proscar; Take 1 tablet (5   mg) by mouth once daily.   levothyroxine 137 mcg tablet; Commonly known as: Synthroid, Levoxyl;   Take 1 tablet (137 mcg) by mouth once daily.   lubricating eye drops ophthalmic solution   mometasone 0.1 % lotion; Commonly known as: Elocon; Apply topically once   daily.   nystatin cream; Commonly known as: Mycostatin     STOP taking these medications     acetaminophen 650 mg ER tablet; Commonly known as: Tylenol 8 HOUR   aspirin-calcium carbonate 81 mg-300 mg calcium(777 mg) tablet   furosemide 20 mg tablet; Commonly known as: Lasix   isosorbide mononitrate ER 30 mg 24 hr tablet; Commonly known as: Imdur   lisinopril 5 mg tablet   pioglitazone 15 mg tablet; Commonly known as: Actos   simvastatin 40 mg tablet; Commonly known as: Zocor       Outpatient Follow-Up  Future Appointments   Date Time Provider Department Center   10/29/2024 10:00 AM Sriram Montesinos MD [id number] None   11/13/2024  1:00 PM Kristian AWAD  MD Srinivasa [id number]       Yovani Lujan DO

## 2024-10-08 ENCOUNTER — TELEPHONE (OUTPATIENT)
Dept: HOME HEALTH SERVICES | Facility: HOME HEALTH | Age: 89
End: 2024-10-08
Payer: MEDICARE

## 2024-10-08 NOTE — TELEPHONE ENCOUNTER
Hello,    Your recent home care referral for Carmen iVnson has been made a Non Admit with  Home Care due to Inability to Contact Patient. If you have further questions, feel free to reach out to our office at [phone].     Thank you,   [facility]

## 2024-10-09 DIAGNOSIS — E78.49 OTHER HYPERLIPIDEMIA: ICD-10-CM

## 2024-10-09 DIAGNOSIS — I16.0 HYPERTENSIVE URGENCY: ICD-10-CM

## 2024-10-09 NOTE — TELEPHONE ENCOUNTER
Patient's wife called in on behalf of patient. He was recently in the hospital. Wife is requesting his new medications that were prescribed be sent into the mail order pharmacy. Patient currently does have a 30 day supply on hand.

## 2024-10-10 RX ORDER — AMLODIPINE BESYLATE 10 MG/1
10 TABLET ORAL DAILY
Qty: 30 TABLET | Refills: 2 | Status: SHIPPED | OUTPATIENT
Start: 2024-10-10 | End: 2025-01-08

## 2024-10-10 RX ORDER — CHLORTHALIDONE 25 MG/1
25 TABLET ORAL EVERY MORNING
Qty: 30 TABLET | Refills: 2 | Status: SHIPPED | OUTPATIENT
Start: 2024-10-10 | End: 2025-01-08

## 2024-10-10 RX ORDER — ATORVASTATIN CALCIUM 40 MG/1
40 TABLET, FILM COATED ORAL NIGHTLY
Qty: 30 TABLET | Refills: 2 | Status: SHIPPED | OUTPATIENT
Start: 2024-10-10 | End: 2025-01-08

## 2024-10-14 DIAGNOSIS — E11.9 TYPE 2 DIABETES MELLITUS WITHOUT COMPLICATION, WITHOUT LONG-TERM CURRENT USE OF INSULIN (MULTI): Primary | ICD-10-CM

## 2024-10-22 ENCOUNTER — PATIENT OUTREACH (OUTPATIENT)
Age: 89
End: 2024-10-22
Payer: MEDICARE

## 2024-10-23 ENCOUNTER — LAB (OUTPATIENT)
Dept: LAB | Facility: LAB | Age: 89
End: 2024-10-23
Payer: MEDICARE

## 2024-10-23 DIAGNOSIS — I25.10 CORONARY ARTERY DISEASE INVOLVING NATIVE HEART WITHOUT ANGINA PECTORIS, UNSPECIFIED VESSEL OR LESION TYPE: ICD-10-CM

## 2024-10-23 DIAGNOSIS — E11.9 TYPE 2 DIABETES MELLITUS WITHOUT COMPLICATION, WITHOUT LONG-TERM CURRENT USE OF INSULIN (MULTI): ICD-10-CM

## 2024-10-23 DIAGNOSIS — R97.20 ELEVATED PSA MEASUREMENT: ICD-10-CM

## 2024-10-23 LAB
ALBUMIN SERPL BCP-MCNC: 4 G/DL (ref 3.4–5)
ALP SERPL-CCNC: 50 U/L (ref 33–136)
ALT SERPL W P-5'-P-CCNC: 23 U/L (ref 10–52)
ANION GAP SERPL CALC-SCNC: 15 MMOL/L (ref 10–20)
AST SERPL W P-5'-P-CCNC: 22 U/L (ref 9–39)
BILIRUB SERPL-MCNC: 0.6 MG/DL (ref 0–1.2)
BUN SERPL-MCNC: 37 MG/DL (ref 6–23)
CALCIUM SERPL-MCNC: 10 MG/DL (ref 8.6–10.3)
CHLORIDE SERPL-SCNC: 105 MMOL/L (ref 98–107)
CO2 SERPL-SCNC: 25 MMOL/L (ref 21–32)
CREAT SERPL-MCNC: 1.98 MG/DL (ref 0.5–1.3)
EGFRCR SERPLBLD CKD-EPI 2021: 30 ML/MIN/1.73M*2
ERYTHROCYTE [DISTWIDTH] IN BLOOD BY AUTOMATED COUNT: 11.6 % (ref 11.5–14.5)
EST. AVERAGE GLUCOSE BLD GHB EST-MCNC: 146 MG/DL
GLUCOSE SERPL-MCNC: 127 MG/DL (ref 74–99)
HBA1C MFR BLD: 6.7 %
HCT VFR BLD AUTO: 34.4 % (ref 41–52)
HGB BLD-MCNC: 11.6 G/DL (ref 13.5–17.5)
MCH RBC QN AUTO: 33 PG (ref 26–34)
MCHC RBC AUTO-ENTMCNC: 33.7 G/DL (ref 32–36)
MCV RBC AUTO: 98 FL (ref 80–100)
NRBC BLD-RTO: 0 /100 WBCS (ref 0–0)
PLATELET # BLD AUTO: 314 X10*3/UL (ref 150–450)
POTASSIUM SERPL-SCNC: 3.9 MMOL/L (ref 3.5–5.3)
PROT SERPL-MCNC: 6.3 G/DL (ref 6.4–8.2)
PSA SERPL-MCNC: 21.71 NG/ML
RBC # BLD AUTO: 3.51 X10*6/UL (ref 4.5–5.9)
SODIUM SERPL-SCNC: 141 MMOL/L (ref 136–145)
WBC # BLD AUTO: 11 X10*3/UL (ref 4.4–11.3)

## 2024-10-23 PROCEDURE — 80053 COMPREHEN METABOLIC PANEL: CPT

## 2024-10-23 PROCEDURE — 85027 COMPLETE CBC AUTOMATED: CPT

## 2024-10-23 PROCEDURE — 36415 COLL VENOUS BLD VENIPUNCTURE: CPT

## 2024-10-23 PROCEDURE — 84153 ASSAY OF PSA TOTAL: CPT

## 2024-10-23 PROCEDURE — 83036 HEMOGLOBIN GLYCOSYLATED A1C: CPT

## 2024-10-29 ENCOUNTER — APPOINTMENT (OUTPATIENT)
Dept: PRIMARY CARE | Facility: CLINIC | Age: 89
End: 2024-10-29
Payer: MEDICARE

## 2024-10-29 VITALS
HEART RATE: 88 BPM | OXYGEN SATURATION: 98 % | WEIGHT: 164 LBS | SYSTOLIC BLOOD PRESSURE: 150 MMHG | DIASTOLIC BLOOD PRESSURE: 60 MMHG | BODY MASS INDEX: 27.32 KG/M2 | HEIGHT: 65 IN

## 2024-10-29 DIAGNOSIS — I25.10 CORONARY ARTERY DISEASE INVOLVING NATIVE HEART WITHOUT ANGINA PECTORIS, UNSPECIFIED VESSEL OR LESION TYPE: ICD-10-CM

## 2024-10-29 DIAGNOSIS — E03.9 ACQUIRED HYPOTHYROIDISM: ICD-10-CM

## 2024-10-29 DIAGNOSIS — I63.9 OCCIPITAL STROKE (MULTI): ICD-10-CM

## 2024-10-29 DIAGNOSIS — Z00.00 ROUTINE GENERAL MEDICAL EXAMINATION AT HEALTH CARE FACILITY: Primary | ICD-10-CM

## 2024-10-29 DIAGNOSIS — E11.9 TYPE 2 DIABETES MELLITUS WITHOUT COMPLICATION, WITHOUT LONG-TERM CURRENT USE OF INSULIN (MULTI): ICD-10-CM

## 2024-10-29 DIAGNOSIS — I10 HTN (HYPERTENSION), BENIGN: ICD-10-CM

## 2024-10-29 PROBLEM — I63.439 CEREBROVASCULAR ACCIDENT (CVA) DUE TO EMBOLISM OF POSTERIOR CEREBRAL ARTERY WITH INFARCTIONS OF BOTH OCCIPITAL LOBES (MULTI): Status: ACTIVE | Noted: 2024-10-29

## 2024-10-29 PROBLEM — I63.439 CEREBROVASCULAR ACCIDENT (CVA) DUE TO EMBOLISM OF POSTERIOR CEREBRAL ARTERY WITH INFARCTIONS OF BOTH OCCIPITAL LOBES (MULTI): Status: RESOLVED | Noted: 2024-10-29 | Resolved: 2024-10-29

## 2024-10-29 PROCEDURE — 1159F MED LIST DOCD IN RCRD: CPT | Performed by: FAMILY MEDICINE

## 2024-10-29 PROCEDURE — 3077F SYST BP >= 140 MM HG: CPT | Performed by: FAMILY MEDICINE

## 2024-10-29 PROCEDURE — 1036F TOBACCO NON-USER: CPT | Performed by: FAMILY MEDICINE

## 2024-10-29 PROCEDURE — 1170F FXNL STATUS ASSESSED: CPT | Performed by: FAMILY MEDICINE

## 2024-10-29 PROCEDURE — 99214 OFFICE O/P EST MOD 30 MIN: CPT | Performed by: FAMILY MEDICINE

## 2024-10-29 PROCEDURE — 1111F DSCHRG MED/CURRENT MED MERGE: CPT | Performed by: FAMILY MEDICINE

## 2024-10-29 PROCEDURE — 1160F RVW MEDS BY RX/DR IN RCRD: CPT | Performed by: FAMILY MEDICINE

## 2024-10-29 PROCEDURE — 3078F DIAST BP <80 MM HG: CPT | Performed by: FAMILY MEDICINE

## 2024-10-29 PROCEDURE — 1157F ADVNC CARE PLAN IN RCRD: CPT | Performed by: FAMILY MEDICINE

## 2024-10-29 PROCEDURE — G0439 PPPS, SUBSEQ VISIT: HCPCS | Performed by: FAMILY MEDICINE

## 2024-10-29 RX ORDER — PIOGLITAZONEHYDROCHLORIDE 15 MG/1
15 TABLET ORAL DAILY
Qty: 90 TABLET | Refills: 3 | Status: SHIPPED | OUTPATIENT
Start: 2024-10-29 | End: 2025-10-29

## 2024-10-29 ASSESSMENT — ENCOUNTER SYMPTOMS
OCCASIONAL FEELINGS OF UNSTEADINESS: 1
DEPRESSION: 0
LOSS OF SENSATION IN FEET: 0

## 2024-10-29 ASSESSMENT — ACTIVITIES OF DAILY LIVING (ADL)
DRESSING: INDEPENDENT
DOING_HOUSEWORK: INDEPENDENT
BATHING: INDEPENDENT
TAKING_MEDICATION: INDEPENDENT
MANAGING_FINANCES: INDEPENDENT
GROCERY_SHOPPING: NEEDS ASSISTANCE

## 2024-11-12 ENCOUNTER — LAB (OUTPATIENT)
Dept: LAB | Facility: LAB | Age: 89
End: 2024-11-12
Payer: MEDICARE

## 2024-11-12 ENCOUNTER — TELEPHONE (OUTPATIENT)
Age: 89
End: 2024-11-12

## 2024-11-12 DIAGNOSIS — I10 HTN (HYPERTENSION), BENIGN: ICD-10-CM

## 2024-11-12 LAB
ANION GAP SERPL CALC-SCNC: 13 MMOL/L (ref 10–20)
BUN SERPL-MCNC: 31 MG/DL (ref 6–23)
CALCIUM SERPL-MCNC: 10.5 MG/DL (ref 8.6–10.3)
CHLORIDE SERPL-SCNC: 107 MMOL/L (ref 98–107)
CO2 SERPL-SCNC: 27 MMOL/L (ref 21–32)
CREAT SERPL-MCNC: 1.75 MG/DL (ref 0.5–1.3)
EGFRCR SERPLBLD CKD-EPI 2021: 35 ML/MIN/1.73M*2
GLUCOSE SERPL-MCNC: 139 MG/DL (ref 74–99)
POTASSIUM SERPL-SCNC: 4 MMOL/L (ref 3.5–5.3)
SODIUM SERPL-SCNC: 143 MMOL/L (ref 136–145)

## 2024-11-12 PROCEDURE — 80048 BASIC METABOLIC PNL TOTAL CA: CPT

## 2024-11-12 PROCEDURE — 36415 COLL VENOUS BLD VENIPUNCTURE: CPT

## 2024-11-12 ASSESSMENT — ENCOUNTER SYMPTOMS
ALLERGIC/IMMUNOLOGIC NEGATIVE: 1
FEVER: 0
CHILLS: 0
PSYCHIATRIC NEGATIVE: 1
ENDOCRINE NEGATIVE: 1
SHORTNESS OF BREATH: 0
EYES NEGATIVE: 1
COUGH: 0
NAUSEA: 0
DIFFICULTY URINATING: 0

## 2024-11-12 NOTE — PROGRESS NOTES
Subjective   Patient ID: Carmen Vinson is a 89 y.o. male.    HPI  Patient has hx of elevated PSA....Most recent PSA was 10.76 on 5/24 Prior PSA was 11.12 on 10/23. Prior PSA was 10.78 ON 4/23. Prior psa was 9.01 on 11/22. Prior PSA was 8.17 on 11/21..Prior PSA was 7.76 11/20.. Prior PSA was 7.35 (11/19) Previous PSA was 7.0 on 11/18...Pt hx has a FHx of prostate ca(father)... Pt has hx of negative trus bx..BPH sx are mild and stable and only mildly bothersome. ....Some urgency and frequency....Denies dysuria....Denies hematuria...Nocturia x1....Pt. is taking Proscar...This has helped with LUTS..Hx of prostatitis. No recent sx. ED is chronic..No medication for this. He is taking Isosorbide.  Patient had renal cysts seen on CT 10/24         Review of Systems   Constitutional:  Negative for chills and fever.   HENT: Negative.     Eyes: Negative.    Respiratory:  Negative for cough and shortness of breath.    Cardiovascular:  Negative for chest pain and leg swelling.   Gastrointestinal:  Negative for nausea.   Endocrine: Negative.    Genitourinary:  Negative for difficulty urinating.        Negative except for documented in HPI   Allergic/Immunologic: Negative.    Neurological:         Alert & oriented X 3   Hematological:         Denies blood thinners   Psychiatric/Behavioral: Negative.         Objective   Physical Exam  Vitals and nursing note reviewed.   Constitutional:       General: He is not in acute distress.     Appearance: Normal appearance.   Pulmonary:      Effort: Pulmonary effort is normal.   Abdominal:      Tenderness: There is no abdominal tenderness.   Genitourinary:     Comments: Kidneys non palpable bilaterally  Bladder non palpable or tender  Scrotum no mass, No hydrocele  Epididymis- No spermatocele. Non Tender.  Testicles: No mass. SOFT  Urethra: No discharge  Penis within normal limits... No lesions. BENIGN  Prostate - symmetric, no nodules. Uncircumcised. No pHimosis  Seminal Vesicals: No  mass.  Sphincter tone: normal  Neurological:      Mental Status: He is alert.         Assessment/Plan   Diagnoses and all orders for this visit:  Benign prostatic hyperplasia with lower urinary tract symptoms, symptom details unspecified  -     finasteride (Proscar) 5 mg tablet; Take 1 tablet (5 mg) by mouth once daily.  Elevated PSA measurement  -     Prostate Specific Antigen; Future  Erectile dysfunction, unspecified erectile dysfunction type  Nocturia  -     Prostate Specific Antigen; Future    All available PSA values reviewed, Options discussed. Questions answered.  Pros/cons of Prostate mri REVIEWED-GIVEN AGE AND STABILITY OF PSA WILL HOLD OFF  Past Bx results reviewed   Diet changes for prostate health discussed and educational information given. Pros/Cons of prostate health supplements discussed.   Treatment options for LUTS reviewed  Proscar Rx refilled  Discussed timed voiding. Discussed fluid and caffeine intake  Treatment options for ED reviewed-observe  Lifestyle change to help prevent UTIs discussed. Encouraged fluid intake.  BMP reviewed  CT reviewed-Observe renal cysts  F/U 1 year

## 2024-11-13 ENCOUNTER — APPOINTMENT (OUTPATIENT)
Dept: UROLOGY | Facility: CLINIC | Age: 89
End: 2024-11-13
Payer: MEDICARE

## 2024-11-13 VITALS
BODY MASS INDEX: 27.62 KG/M2 | DIASTOLIC BLOOD PRESSURE: 80 MMHG | WEIGHT: 166 LBS | SYSTOLIC BLOOD PRESSURE: 128 MMHG | RESPIRATION RATE: 16 BRPM

## 2024-11-13 DIAGNOSIS — N40.1 BENIGN PROSTATIC HYPERPLASIA WITH LOWER URINARY TRACT SYMPTOMS, SYMPTOM DETAILS UNSPECIFIED: ICD-10-CM

## 2024-11-13 DIAGNOSIS — R35.1 NOCTURIA: ICD-10-CM

## 2024-11-13 DIAGNOSIS — N52.9 ERECTILE DYSFUNCTION, UNSPECIFIED ERECTILE DYSFUNCTION TYPE: ICD-10-CM

## 2024-11-13 DIAGNOSIS — R97.20 ELEVATED PSA MEASUREMENT: ICD-10-CM

## 2024-11-13 PROCEDURE — 3074F SYST BP LT 130 MM HG: CPT | Performed by: UROLOGY

## 2024-11-13 PROCEDURE — 1159F MED LIST DOCD IN RCRD: CPT | Performed by: UROLOGY

## 2024-11-13 PROCEDURE — 3079F DIAST BP 80-89 MM HG: CPT | Performed by: UROLOGY

## 2024-11-13 PROCEDURE — 1036F TOBACCO NON-USER: CPT | Performed by: UROLOGY

## 2024-11-13 PROCEDURE — 1157F ADVNC CARE PLAN IN RCRD: CPT | Performed by: UROLOGY

## 2024-11-13 PROCEDURE — 99214 OFFICE O/P EST MOD 30 MIN: CPT | Performed by: UROLOGY

## 2024-11-13 RX ORDER — FINASTERIDE 5 MG/1
5 TABLET, FILM COATED ORAL DAILY
Qty: 90 TABLET | Refills: 3 | Status: SHIPPED | OUTPATIENT
Start: 2024-11-13

## 2024-11-26 ENCOUNTER — APPOINTMENT (OUTPATIENT)
Age: 89
End: 2024-11-26
Payer: MEDICARE

## 2024-11-26 VITALS
SYSTOLIC BLOOD PRESSURE: 160 MMHG | HEIGHT: 65 IN | HEART RATE: 83 BPM | DIASTOLIC BLOOD PRESSURE: 60 MMHG | OXYGEN SATURATION: 99 % | WEIGHT: 168 LBS | BODY MASS INDEX: 27.99 KG/M2

## 2024-11-26 DIAGNOSIS — E11.9 TYPE 2 DIABETES MELLITUS WITHOUT COMPLICATION, WITHOUT LONG-TERM CURRENT USE OF INSULIN (MULTI): ICD-10-CM

## 2024-11-26 DIAGNOSIS — N41.0 ACUTE PROSTATITIS: Primary | ICD-10-CM

## 2024-11-26 DIAGNOSIS — R10.30 INGUINAL PAIN, UNSPECIFIED LATERALITY: ICD-10-CM

## 2024-11-26 LAB
POC APPEARANCE, URINE: CLEAR
POC BILIRUBIN, URINE: NEGATIVE
POC BLOOD, URINE: NEGATIVE
POC COLOR, URINE: YELLOW
POC GLUCOSE, URINE: NEGATIVE MG/DL
POC KETONES, URINE: NEGATIVE MG/DL
POC LEUKOCYTES, URINE: NEGATIVE
POC NITRITE,URINE: NEGATIVE
POC PH, URINE: 6.5 PH
POC PROTEIN, URINE: NORMAL MG/DL
POC SPECIFIC GRAVITY, URINE: 1.02
POC UROBILINOGEN, URINE: 0.2 EU/DL

## 2024-11-26 PROCEDURE — 1157F ADVNC CARE PLAN IN RCRD: CPT | Performed by: FAMILY MEDICINE

## 2024-11-26 PROCEDURE — 81003 URINALYSIS AUTO W/O SCOPE: CPT | Performed by: FAMILY MEDICINE

## 2024-11-26 PROCEDURE — 1160F RVW MEDS BY RX/DR IN RCRD: CPT | Performed by: FAMILY MEDICINE

## 2024-11-26 PROCEDURE — 3078F DIAST BP <80 MM HG: CPT | Performed by: FAMILY MEDICINE

## 2024-11-26 PROCEDURE — 1036F TOBACCO NON-USER: CPT | Performed by: FAMILY MEDICINE

## 2024-11-26 PROCEDURE — 99213 OFFICE O/P EST LOW 20 MIN: CPT | Performed by: FAMILY MEDICINE

## 2024-11-26 PROCEDURE — 3077F SYST BP >= 140 MM HG: CPT | Performed by: FAMILY MEDICINE

## 2024-11-26 PROCEDURE — 1159F MED LIST DOCD IN RCRD: CPT | Performed by: FAMILY MEDICINE

## 2024-11-26 RX ORDER — SULFAMETHOXAZOLE AND TRIMETHOPRIM 800; 160 MG/1; MG/1
1 TABLET ORAL 2 TIMES DAILY
Qty: 20 TABLET | Refills: 1 | Status: SHIPPED | OUTPATIENT
Start: 2024-11-26 | End: 2024-12-06

## 2024-11-26 NOTE — PROGRESS NOTES
"Subjective   Patient ID: Carmen Vinson is a 89 y.o. male who presents for Groin Pain.    HPI   Noted inc freq without urge and dysuria and pain in groin, similar to prev infections  UA with sm protein.  Review of Systems  No fever chills nausea vomiting or systemic symptoms  Objective   /60   Pulse 83   Ht 1.651 m (5' 5\")   Wt 76.2 kg (168 lb)   SpO2 99%   BMI 27.96 kg/m²     Physical Exam  Heart regular.  Lungs clear  Assessment/Plan   Problem List Items Addressed This Visit             ICD-10-CM    Diabetes mellitus (Multi) E11.9    Relevant Orders    CBC    Comprehensive Metabolic Panel    Hemoglobin A1C     Other Visit Diagnoses         Codes    Acute prostatitis    -  Primary N41.0    Relevant Medications    sulfamethoxazole-trimethoprim (Bactrim DS) 800-160 mg tablet    Inguinal pain, unspecified laterality     R10.30    Relevant Medications    sulfamethoxazole-trimethoprim (Bactrim DS) 800-160 mg tablet    Other Relevant Orders    POCT UA Automated manually resulted (Completed)               "

## 2025-01-02 DIAGNOSIS — E78.49 OTHER HYPERLIPIDEMIA: ICD-10-CM

## 2025-01-02 DIAGNOSIS — I16.0 HYPERTENSIVE URGENCY: ICD-10-CM

## 2025-01-02 RX ORDER — AMLODIPINE BESYLATE 10 MG/1
10 TABLET ORAL DAILY
Qty: 90 TABLET | Refills: 3 | Status: SHIPPED | OUTPATIENT
Start: 2025-01-02 | End: 2026-01-02

## 2025-01-02 RX ORDER — CHLORTHALIDONE 25 MG/1
25 TABLET ORAL EVERY MORNING
Qty: 90 TABLET | Refills: 3 | Status: SHIPPED | OUTPATIENT
Start: 2025-01-02 | End: 2026-01-02

## 2025-01-02 RX ORDER — ATORVASTATIN CALCIUM 40 MG/1
40 TABLET, FILM COATED ORAL NIGHTLY
Qty: 90 TABLET | Refills: 3 | Status: SHIPPED | OUTPATIENT
Start: 2025-01-02 | End: 2026-01-02

## 2025-01-17 ENCOUNTER — LAB (OUTPATIENT)
Dept: LAB | Facility: LAB | Age: OVER 89
End: 2025-01-17
Payer: MEDICARE

## 2025-01-17 DIAGNOSIS — R35.1 NOCTURIA: ICD-10-CM

## 2025-01-17 DIAGNOSIS — E11.9 TYPE 2 DIABETES MELLITUS WITHOUT COMPLICATION, WITHOUT LONG-TERM CURRENT USE OF INSULIN (MULTI): ICD-10-CM

## 2025-01-17 DIAGNOSIS — R97.20 ELEVATED PSA MEASUREMENT: ICD-10-CM

## 2025-01-17 LAB
ALBUMIN SERPL BCP-MCNC: 4 G/DL (ref 3.4–5)
ALP SERPL-CCNC: 50 U/L (ref 33–136)
ALT SERPL W P-5'-P-CCNC: 21 U/L (ref 10–52)
ANION GAP SERPL CALC-SCNC: 12 MMOL/L (ref 10–20)
AST SERPL W P-5'-P-CCNC: 20 U/L (ref 9–39)
BILIRUB SERPL-MCNC: 0.5 MG/DL (ref 0–1.2)
BUN SERPL-MCNC: 41 MG/DL (ref 6–23)
CALCIUM SERPL-MCNC: 9.7 MG/DL (ref 8.6–10.3)
CHLORIDE SERPL-SCNC: 105 MMOL/L (ref 98–107)
CO2 SERPL-SCNC: 29 MMOL/L (ref 21–32)
CREAT SERPL-MCNC: 1.52 MG/DL (ref 0.5–1.3)
EGFRCR SERPLBLD CKD-EPI 2021: 41 ML/MIN/1.73M*2
ERYTHROCYTE [DISTWIDTH] IN BLOOD BY AUTOMATED COUNT: 11.4 % (ref 11.5–14.5)
EST. AVERAGE GLUCOSE BLD GHB EST-MCNC: 157 MG/DL
GLUCOSE SERPL-MCNC: 122 MG/DL (ref 74–99)
HBA1C MFR BLD: 7.1 %
HCT VFR BLD AUTO: 35.1 % (ref 41–52)
HGB BLD-MCNC: 11.4 G/DL (ref 13.5–17.5)
MCH RBC QN AUTO: 32.2 PG (ref 26–34)
MCHC RBC AUTO-ENTMCNC: 32.5 G/DL (ref 32–36)
MCV RBC AUTO: 99 FL (ref 80–100)
NRBC BLD-RTO: 0 /100 WBCS (ref 0–0)
PLATELET # BLD AUTO: 265 X10*3/UL (ref 150–450)
POTASSIUM SERPL-SCNC: 3.8 MMOL/L (ref 3.5–5.3)
PROT SERPL-MCNC: 6.3 G/DL (ref 6.4–8.2)
PSA SERPL-MCNC: 17.03 NG/ML
RBC # BLD AUTO: 3.54 X10*6/UL (ref 4.5–5.9)
SODIUM SERPL-SCNC: 142 MMOL/L (ref 136–145)
WBC # BLD AUTO: 9.2 X10*3/UL (ref 4.4–11.3)

## 2025-01-17 PROCEDURE — 84153 ASSAY OF PSA TOTAL: CPT

## 2025-01-17 PROCEDURE — 85027 COMPLETE CBC AUTOMATED: CPT

## 2025-01-17 PROCEDURE — 80053 COMPREHEN METABOLIC PANEL: CPT

## 2025-01-17 PROCEDURE — 83036 HEMOGLOBIN GLYCOSYLATED A1C: CPT

## 2025-01-24 ENCOUNTER — APPOINTMENT (OUTPATIENT)
Age: OVER 89
End: 2025-01-24
Payer: MEDICARE

## 2025-01-24 VITALS
DIASTOLIC BLOOD PRESSURE: 60 MMHG | BODY MASS INDEX: 28.12 KG/M2 | HEART RATE: 78 BPM | SYSTOLIC BLOOD PRESSURE: 130 MMHG | OXYGEN SATURATION: 97 % | WEIGHT: 169 LBS

## 2025-01-24 DIAGNOSIS — E03.9 ACQUIRED HYPOTHYROIDISM: ICD-10-CM

## 2025-01-24 DIAGNOSIS — N18.32 CKD STAGE 3B, GFR 30-44 ML/MIN (MULTI): ICD-10-CM

## 2025-01-24 DIAGNOSIS — E11.9 TYPE 2 DIABETES MELLITUS WITHOUT COMPLICATION, WITHOUT LONG-TERM CURRENT USE OF INSULIN (MULTI): ICD-10-CM

## 2025-01-24 DIAGNOSIS — Z00.00 ROUTINE GENERAL MEDICAL EXAMINATION AT HEALTH CARE FACILITY: ICD-10-CM

## 2025-01-24 DIAGNOSIS — I10 HTN (HYPERTENSION), BENIGN: Primary | ICD-10-CM

## 2025-01-24 PROCEDURE — 3075F SYST BP GE 130 - 139MM HG: CPT | Performed by: FAMILY MEDICINE

## 2025-01-24 PROCEDURE — 1159F MED LIST DOCD IN RCRD: CPT | Performed by: FAMILY MEDICINE

## 2025-01-24 PROCEDURE — 1157F ADVNC CARE PLAN IN RCRD: CPT | Performed by: FAMILY MEDICINE

## 2025-01-24 PROCEDURE — 1036F TOBACCO NON-USER: CPT | Performed by: FAMILY MEDICINE

## 2025-01-24 PROCEDURE — 1160F RVW MEDS BY RX/DR IN RCRD: CPT | Performed by: FAMILY MEDICINE

## 2025-01-24 PROCEDURE — G2211 COMPLEX E/M VISIT ADD ON: HCPCS | Performed by: FAMILY MEDICINE

## 2025-01-24 PROCEDURE — 3078F DIAST BP <80 MM HG: CPT | Performed by: FAMILY MEDICINE

## 2025-01-24 PROCEDURE — 99214 OFFICE O/P EST MOD 30 MIN: CPT | Performed by: FAMILY MEDICINE

## 2025-01-24 RX ORDER — LEVOTHYROXINE SODIUM 137 UG/1
137 TABLET ORAL DAILY
Qty: 90 TABLET | Refills: 3 | Status: SHIPPED | OUTPATIENT
Start: 2025-01-24 | End: 2026-01-24

## 2025-01-24 NOTE — PROGRESS NOTES
Subjective   Patient ID: Carmen Vinson is a 89 y.o. male who presents for Follow-up (3 mo rev labs).    HPI   Since the last office visit there have been no interval operations, hospitalizations, important illnesses or injuries.  Home bp 130/60 on half chorlthal  HTN-Takes and tolerates meds without side effects. No alcohol. no tobacco. no exercise. low salt.  Reviewed recommendation for 150 minutes of exercise per week including 2 days of weight training if over age 50  Edema betterwoth legsup  Hyperlipidemia- is on a statin and a prudent diet.  Hypothyroid- is euthyroid on replacement. Thyroid ros is unremarkable.  DM no cks at goal no lows , ashley as monotheraoy, off met as creat up.  Review of Systems  Denies chest pains palpitations cough shortness of breath  Objective   /56   Pulse 78   Wt 76.7 kg (169 lb)   SpO2 97%   BMI 28.12 kg/m²     Physical Exam  Heart regular without murmur lungs clear to auscultation 1+ edema ankles  Assessment/Plan   Problem List Items Addressed This Visit             ICD-10-CM    Diabetes mellitus (Multi) E11.9    Relevant Orders    Follow Up In Primary Care    Hemoglobin A1C    HTN (hypertension), benign - Primary I10    Relevant Orders    Follow Up In Primary Care    Basic Metabolic Panel    Hypothyroid E03.9    Relevant Medications    levothyroxine (Synthroid, Levoxyl) 137 mcg tablet    Other Relevant Orders    Follow Up In Primary Care    CKD stage 3b, GFR 30-44 ml/min (Multi) N18.32    Relevant Orders    Follow Up In Primary Care    Basic Metabolic Panel     Other Visit Diagnoses         Codes    Routine general medical examination at health care facility     Z00.00

## 2025-03-25 ENCOUNTER — TELEPHONE (OUTPATIENT)
Age: OVER 89
End: 2025-03-25
Payer: MEDICARE

## 2025-03-25 NOTE — TELEPHONE ENCOUNTER
PATIENT WITH RIGHT  KNEE PAIN.  WAKES HIM UP AND HE USES ARVIN SILVA.   THIS HELPS FOR A WHILE.     HURTS MOSTLY AT NIGHT.    NO SWELLING    HAD LEFT KNEE REPLACED, BUT RIGHT KNEE  HURTS.    WOULD LIKE RX     [facility]

## 2025-03-31 ENCOUNTER — OFFICE VISIT (OUTPATIENT)
Age: OVER 89
End: 2025-03-31
Payer: MEDICARE

## 2025-03-31 VITALS
DIASTOLIC BLOOD PRESSURE: 82 MMHG | SYSTOLIC BLOOD PRESSURE: 152 MMHG | HEART RATE: 79 BPM | WEIGHT: 178.4 LBS | BODY MASS INDEX: 29.72 KG/M2 | HEIGHT: 65 IN | OXYGEN SATURATION: 99 %

## 2025-03-31 DIAGNOSIS — M25.561 ACUTE PAIN OF RIGHT KNEE: Primary | ICD-10-CM

## 2025-03-31 PROCEDURE — 1124F ACP DISCUSS-NO DSCNMKR DOCD: CPT | Performed by: FAMILY MEDICINE

## 2025-03-31 PROCEDURE — 1036F TOBACCO NON-USER: CPT | Performed by: FAMILY MEDICINE

## 2025-03-31 PROCEDURE — 1160F RVW MEDS BY RX/DR IN RCRD: CPT | Performed by: FAMILY MEDICINE

## 2025-03-31 PROCEDURE — 3079F DIAST BP 80-89 MM HG: CPT | Performed by: FAMILY MEDICINE

## 2025-03-31 PROCEDURE — 20610 DRAIN/INJ JOINT/BURSA W/O US: CPT | Performed by: FAMILY MEDICINE

## 2025-03-31 PROCEDURE — 1159F MED LIST DOCD IN RCRD: CPT | Performed by: FAMILY MEDICINE

## 2025-03-31 PROCEDURE — 1157F ADVNC CARE PLAN IN RCRD: CPT | Performed by: FAMILY MEDICINE

## 2025-03-31 PROCEDURE — 3077F SYST BP >= 140 MM HG: CPT | Performed by: FAMILY MEDICINE

## 2025-03-31 RX ORDER — TRIAMCINOLONE ACETONIDE 40 MG/ML
40 INJECTION, SUSPENSION INTRA-ARTICULAR; INTRAMUSCULAR
Status: COMPLETED | OUTPATIENT
Start: 2025-03-31 | End: 2025-03-31

## 2025-03-31 RX ADMIN — TRIAMCINOLONE ACETONIDE 40 MG: 40 INJECTION, SUSPENSION INTRA-ARTICULAR; INTRAMUSCULAR at 15:10

## 2025-03-31 ASSESSMENT — PATIENT HEALTH QUESTIONNAIRE - PHQ9
1. LITTLE INTEREST OR PLEASURE IN DOING THINGS: NOT AT ALL
2. FEELING DOWN, DEPRESSED OR HOPELESS: NOT AT ALL
SUM OF ALL RESPONSES TO PHQ9 QUESTIONS 1 AND 2: 0
1. LITTLE INTEREST OR PLEASURE IN DOING THINGS: NOT AT ALL
SUM OF ALL RESPONSES TO PHQ9 QUESTIONS 1 AND 2: 0
2. FEELING DOWN, DEPRESSED OR HOPELESS: NOT AT ALL

## 2025-03-31 ASSESSMENT — ENCOUNTER SYMPTOMS
LOSS OF SENSATION IN FEET: 0
ARTHRALGIAS: 1
DEPRESSION: 0
OCCASIONAL FEELINGS OF UNSTEADINESS: 0

## 2025-03-31 NOTE — PROGRESS NOTES
"Subjective   Patient ID: Carmen Vinson is a 89 y.o. male who presents for Knee Pain (Right knee pain- see phone encounter from 3/25--).    HPI   Increasing right knee pain for weeks.  Tylenol is not helping.  Cannot use NSAIDs due to kidney function.  About strong pain pills versus shot.  Will go ahead and give him a cortisone shot he has had some before and his left knee.    On exam negative Alana but some tenderness to both joint lines of the right knee.    Joint Injection Large/Arthrocentesis: R knee on 3/31/2025 3:10 PM  Indications: pain and joint swelling  Details: 25 G needle, superolateral approach  Medications: 40 mg triamcinolone acetonide 40 mg/mL  Outcome: tolerated well, no immediate complications  Procedure, treatment alternatives, risks and benefits explained, specific risks discussed. Consent was given by the patient.             Review of Systems   Musculoskeletal:  Positive for arthralgias and gait problem.   Skin:  Negative for rash.       Objective   /82   Pulse 79   Ht 1.651 m (5' 5\")   Wt 80.9 kg (178 lb 6.4 oz)   SpO2 99%   BMI 29.69 kg/m²     Physical Exam  Constitutional:       Appearance: Normal appearance.   Skin:     General: Skin is warm and dry.   Neurological:      General: No focal deficit present.      Mental Status: He is alert and oriented to person, place, and time.   Psychiatric:         Mood and Affect: Mood normal.         Behavior: Behavior normal.         Judgment: Judgment normal.         Assessment/Plan   Problem List Items Addressed This Visit             ICD-10-CM    Right knee pain - Primary M25.561          "

## 2025-04-07 ENCOUNTER — TELEPHONE (OUTPATIENT)
Age: OVER 89
End: 2025-04-07
Payer: MEDICARE

## 2025-04-07 DIAGNOSIS — M25.561 ACUTE PAIN OF RIGHT KNEE: ICD-10-CM

## 2025-04-07 NOTE — TELEPHONE ENCOUNTER
Pt's wife called. Pt saw Dr. Do on 3/31/25 for L knee pain, had a cortisone injection. No relief. Tylenol doesn't work and can't take NSAIDs. Wondering if they can try anything stronger or what you would like to do. Please advise.

## 2025-04-19 LAB
ALBUMIN SERPL-MCNC: 4 G/DL (ref 3.6–5.1)
ALP SERPL-CCNC: 42 U/L (ref 35–144)
ALT SERPL-CCNC: 25 U/L (ref 9–46)
ANION GAP SERPL CALCULATED.4IONS-SCNC: 12 MMOL/L (CALC) (ref 7–17)
AST SERPL-CCNC: 22 U/L (ref 10–35)
BILIRUB SERPL-MCNC: 0.5 MG/DL (ref 0.2–1.2)
BUN SERPL-MCNC: 32 MG/DL (ref 7–25)
CALCIUM SERPL-MCNC: 9.1 MG/DL (ref 8.6–10.3)
CHLORIDE SERPL-SCNC: 104 MMOL/L (ref 98–110)
CO2 SERPL-SCNC: 25 MMOL/L (ref 20–32)
CREAT SERPL-MCNC: 1.45 MG/DL (ref 0.7–1.22)
EGFRCR SERPLBLD CKD-EPI 2021: 44 ML/MIN/1.73M2
ERYTHROCYTE [DISTWIDTH] IN BLOOD BY AUTOMATED COUNT: 11.5 % (ref 11–15)
EST. AVERAGE GLUCOSE BLD GHB EST-MCNC: 174 MG/DL
EST. AVERAGE GLUCOSE BLD GHB EST-SCNC: 9.7 MMOL/L
GLUCOSE SERPL-MCNC: 137 MG/DL (ref 65–99)
HBA1C MFR BLD: 7.7 %
HCT VFR BLD AUTO: 33.1 % (ref 38.5–50)
HGB BLD-MCNC: 11.2 G/DL (ref 13.2–17.1)
MCH RBC QN AUTO: 33.1 PG (ref 27–33)
MCHC RBC AUTO-ENTMCNC: 33.8 G/DL (ref 32–36)
MCV RBC AUTO: 97.9 FL (ref 80–100)
PLATELET # BLD AUTO: 237 THOUSAND/UL (ref 140–400)
PMV BLD REES-ECKER: 10.1 FL (ref 7.5–12.5)
POTASSIUM SERPL-SCNC: 3.7 MMOL/L (ref 3.5–5.3)
PROT SERPL-MCNC: 6.4 G/DL (ref 6.1–8.1)
RBC # BLD AUTO: 3.38 MILLION/UL (ref 4.2–5.8)
SODIUM SERPL-SCNC: 141 MMOL/L (ref 135–146)
WBC # BLD AUTO: 7.6 THOUSAND/UL (ref 3.8–10.8)

## 2025-04-24 ENCOUNTER — APPOINTMENT (OUTPATIENT)
Age: OVER 89
End: 2025-04-24
Payer: MEDICARE

## 2025-04-24 VITALS
WEIGHT: 175 LBS | BODY MASS INDEX: 29.12 KG/M2 | HEART RATE: 62 BPM | SYSTOLIC BLOOD PRESSURE: 140 MMHG | DIASTOLIC BLOOD PRESSURE: 54 MMHG | OXYGEN SATURATION: 94 %

## 2025-04-24 DIAGNOSIS — I10 HTN (HYPERTENSION), BENIGN: ICD-10-CM

## 2025-04-24 DIAGNOSIS — I25.10 CORONARY ARTERY DISEASE INVOLVING NATIVE HEART WITHOUT ANGINA PECTORIS, UNSPECIFIED VESSEL OR LESION TYPE: ICD-10-CM

## 2025-04-24 DIAGNOSIS — E11.9 TYPE 2 DIABETES MELLITUS WITHOUT COMPLICATION, WITHOUT LONG-TERM CURRENT USE OF INSULIN: ICD-10-CM

## 2025-04-24 DIAGNOSIS — M17.11 ARTHRITIS OF KNEE, RIGHT: Primary | ICD-10-CM

## 2025-04-24 DIAGNOSIS — N18.32 CKD STAGE 3B, GFR 30-44 ML/MIN (MULTI): ICD-10-CM

## 2025-04-24 DIAGNOSIS — E03.9 ACQUIRED HYPOTHYROIDISM: ICD-10-CM

## 2025-04-24 PROCEDURE — 3078F DIAST BP <80 MM HG: CPT | Performed by: FAMILY MEDICINE

## 2025-04-24 PROCEDURE — 1160F RVW MEDS BY RX/DR IN RCRD: CPT | Performed by: FAMILY MEDICINE

## 2025-04-24 PROCEDURE — 1157F ADVNC CARE PLAN IN RCRD: CPT | Performed by: FAMILY MEDICINE

## 2025-04-24 PROCEDURE — G2211 COMPLEX E/M VISIT ADD ON: HCPCS | Performed by: FAMILY MEDICINE

## 2025-04-24 PROCEDURE — 1036F TOBACCO NON-USER: CPT | Performed by: FAMILY MEDICINE

## 2025-04-24 PROCEDURE — 3077F SYST BP >= 140 MM HG: CPT | Performed by: FAMILY MEDICINE

## 2025-04-24 PROCEDURE — 1159F MED LIST DOCD IN RCRD: CPT | Performed by: FAMILY MEDICINE

## 2025-04-24 PROCEDURE — 99214 OFFICE O/P EST MOD 30 MIN: CPT | Performed by: FAMILY MEDICINE

## 2025-04-24 RX ORDER — FUROSEMIDE 40 MG/1
40 TABLET ORAL DAILY
Qty: 30 TABLET | Refills: 11 | Status: SHIPPED | OUTPATIENT
Start: 2025-04-24 | End: 2026-04-24

## 2025-04-24 NOTE — PROGRESS NOTES
Subjective   Patient ID: Carmen Vinson is a 89 y.o. male who presents for Follow-up (3 mo rev labs).    HPI   Wt up 8-9#notes more edema bilateral ankles.  Will replace chlorthalidone with furosemide 40 mg daily and follow-up labs and assessment of degree of edema  CAD no angina  HTN-Takes and tolerates meds without side effects. No alcohol. no tobacco. no exercise. low salt.  Reviewed recommendation for 150 minutes of exercise per week including 2 days of weight training if over age 50  Hypothyroid euthyroid at goal  Severe right knee pain to see leb soon for evaluation  CKD 3B stable  4/18/2025 labs reviewed.  Renal function improved to 1.45, will need to monitor with change to furosemide  Hemoglobin A1c is increased to 7.7, acceptable in the setting.  Hemoglobin stable at 11.2  Review of Systems  Denies chest pains palpitations cough shortness of breath heartburn or abdominal pain.  Complains of knee pain and increasing edema  Objective   /54   Pulse 62   Wt 79.4 kg (175 lb)   SpO2 94%   BMI 29.12 kg/m²     Physical Exam  No bruit heart regular lungs clear 2+ edema  Assessment/Plan   Problem List Items Addressed This Visit           ICD-10-CM    Arthritis of knee, right - Primary M17.11    CAD (coronary artery disease) I25.10    Diabetes mellitus (Multi) E11.9    HTN (hypertension), benign I10    Relevant Medications    furosemide (Lasix) 40 mg tablet    Other Relevant Orders    Follow Up In Primary Care    Basic Metabolic Panel    Hypothyroid E03.9    CKD stage 3b, GFR 30-44 ml/min (Multi) N18.32    Relevant Medications    furosemide (Lasix) 40 mg tablet    Other Relevant Orders    Follow Up In Primary Care    Basic Metabolic Panel   To report on degree of edema in a couple weeks when he gets his labs done to monitor for renal function with Lasix 40 and stopping chlorthalidone

## 2025-05-01 ENCOUNTER — APPOINTMENT (OUTPATIENT)
Dept: ORTHOPEDIC SURGERY | Facility: CLINIC | Age: OVER 89
End: 2025-05-01
Payer: MEDICARE

## 2025-05-07 ENCOUNTER — TELEPHONE (OUTPATIENT)
Age: OVER 89
End: 2025-05-07
Payer: MEDICARE

## 2025-05-07 NOTE — TELEPHONE ENCOUNTER
Pt's wife called w/ update on swelling in legs; states it's still the same from when he was in the office on 4/24/25. Please advise.

## 2025-05-08 LAB
ANION GAP SERPL CALCULATED.4IONS-SCNC: 13 MMOL/L (CALC) (ref 7–17)
BUN SERPL-MCNC: 24 MG/DL (ref 7–25)
BUN/CREAT SERPL: 16 (CALC) (ref 6–22)
CALCIUM SERPL-MCNC: 9.7 MG/DL (ref 8.6–10.3)
CHLORIDE SERPL-SCNC: 103 MMOL/L (ref 98–110)
CO2 SERPL-SCNC: 26 MMOL/L (ref 20–32)
CREAT SERPL-MCNC: 1.46 MG/DL (ref 0.7–1.22)
EGFRCR SERPLBLD CKD-EPI 2021: 43 ML/MIN/1.73M2
EST. AVERAGE GLUCOSE BLD GHB EST-MCNC: 171 MG/DL
EST. AVERAGE GLUCOSE BLD GHB EST-SCNC: 9.5 MMOL/L
GLUCOSE SERPL-MCNC: 132 MG/DL (ref 65–99)
HBA1C MFR BLD: 7.6 %
POTASSIUM SERPL-SCNC: 3.9 MMOL/L (ref 3.5–5.3)
SODIUM SERPL-SCNC: 142 MMOL/L (ref 135–146)

## 2025-05-16 DIAGNOSIS — I10 HTN (HYPERTENSION), BENIGN: Primary | ICD-10-CM

## 2025-05-16 RX ORDER — FUROSEMIDE 80 MG/1
80 TABLET ORAL DAILY
Qty: 90 TABLET | Refills: 3 | Status: SHIPPED | OUTPATIENT
Start: 2025-05-16

## 2025-05-16 RX ORDER — FUROSEMIDE 80 MG/1
50 TABLET ORAL DAILY
COMMUNITY
End: 2025-05-16 | Stop reason: SDUPTHER

## 2025-05-16 NOTE — TELEPHONE ENCOUNTER
Pt needs refill for 80mg lasix. States okay to cut in half if dosage changes, would rather have the 80s than the 40s.

## 2025-05-21 ENCOUNTER — APPOINTMENT (OUTPATIENT)
Age: OVER 89
End: 2025-05-21
Payer: MEDICARE

## 2025-05-21 VITALS
BODY MASS INDEX: 29.12 KG/M2 | SYSTOLIC BLOOD PRESSURE: 160 MMHG | DIASTOLIC BLOOD PRESSURE: 50 MMHG | OXYGEN SATURATION: 97 % | HEART RATE: 81 BPM | WEIGHT: 175 LBS

## 2025-05-21 DIAGNOSIS — R60.0 LOCALIZED EDEMA: Primary | ICD-10-CM

## 2025-05-21 DIAGNOSIS — I10 HTN (HYPERTENSION), BENIGN: ICD-10-CM

## 2025-05-21 DIAGNOSIS — I25.10 CORONARY ARTERY DISEASE INVOLVING NATIVE HEART WITHOUT ANGINA PECTORIS, UNSPECIFIED VESSEL OR LESION TYPE: ICD-10-CM

## 2025-05-21 DIAGNOSIS — C18.9 MALIGNANT NEOPLASM OF COLON, UNSPECIFIED PART OF COLON (MULTI): ICD-10-CM

## 2025-05-21 PROCEDURE — 1036F TOBACCO NON-USER: CPT | Performed by: FAMILY MEDICINE

## 2025-05-21 PROCEDURE — 3077F SYST BP >= 140 MM HG: CPT | Performed by: FAMILY MEDICINE

## 2025-05-21 PROCEDURE — 99213 OFFICE O/P EST LOW 20 MIN: CPT | Performed by: FAMILY MEDICINE

## 2025-05-21 PROCEDURE — 1160F RVW MEDS BY RX/DR IN RCRD: CPT | Performed by: FAMILY MEDICINE

## 2025-05-21 PROCEDURE — 1159F MED LIST DOCD IN RCRD: CPT | Performed by: FAMILY MEDICINE

## 2025-05-21 PROCEDURE — 3078F DIAST BP <80 MM HG: CPT | Performed by: FAMILY MEDICINE

## 2025-05-21 NOTE — PROGRESS NOTES
Subjective   Patient ID: Carmen Vinson is a 89 y.o. male who presents for Leg Swelling.    HPI   Has remote history of colon cancer now at age 89 brought up as HCC.    Here today for evaluation of edema he has lost a few pounds and some improvement.  Edema persists left greater than right.  He is currently on Lasix 80 mg daily.  BMP does not show any change to electrolytes or renal function.  As such I think it is appropriate to Increase from 80/d to 80am and 40 pm.  Has an appointment towards the middle end of July and will get BMP prior to that.  To report in the next couple weeks on blood pressure and edema  HTN-Takes and tolerates meds without side effects. No alcohol. no tobacco. no exercise. low salt.  Reviewed recommendation for 150 minutes of exercise per week including 2 days of weight training if over age 50      Review of Systems  No change in activity tolerance or capacity.  No chest pains pressure heaviness cough or shortness of breath.  Objective   /50   Pulse 81   Wt 79.4 kg (175 lb)   SpO2 97%   BMI 29.12 kg/m²     Physical Exam  No JVD thyroid nontender heart regular lungs clear and 1+ edema on left ankle and trace edema on right  Assessment/Plan   Problem List Items Addressed This Visit           ICD-10-CM    CAD (coronary artery disease) I25.10    Colon cancer (Multi) C18.9    HTN (hypertension), benign I10     Other Visit Diagnoses         Codes      Localized edema    -  Primary R60.0    Relevant Orders    Basic Metabolic Panel

## 2025-06-04 ENCOUNTER — HOSPITAL ENCOUNTER (OUTPATIENT)
Dept: RADIOLOGY | Facility: EXTERNAL LOCATION | Age: OVER 89
Discharge: HOME | End: 2025-06-04

## 2025-06-04 ENCOUNTER — APPOINTMENT (OUTPATIENT)
Dept: ORTHOPEDIC SURGERY | Facility: CLINIC | Age: OVER 89
End: 2025-06-04
Payer: MEDICARE

## 2025-06-04 ENCOUNTER — HOSPITAL ENCOUNTER (OUTPATIENT)
Dept: RADIOLOGY | Facility: CLINIC | Age: OVER 89
Discharge: HOME | End: 2025-06-04
Payer: MEDICARE

## 2025-06-04 DIAGNOSIS — M25.562 LEFT KNEE PAIN, UNSPECIFIED CHRONICITY: ICD-10-CM

## 2025-06-04 DIAGNOSIS — M25.561 ACUTE PAIN OF RIGHT KNEE: ICD-10-CM

## 2025-06-04 PROCEDURE — 73564 X-RAY EXAM KNEE 4 OR MORE: CPT | Mod: RT

## 2025-06-04 PROCEDURE — 1159F MED LIST DOCD IN RCRD: CPT | Performed by: SPECIALIST

## 2025-06-04 PROCEDURE — 73564 X-RAY EXAM KNEE 4 OR MORE: CPT | Mod: RIGHT SIDE | Performed by: RADIOLOGY

## 2025-06-04 PROCEDURE — 1160F RVW MEDS BY RX/DR IN RCRD: CPT | Performed by: SPECIALIST

## 2025-06-04 PROCEDURE — 99214 OFFICE O/P EST MOD 30 MIN: CPT | Performed by: SPECIALIST

## 2025-06-04 PROCEDURE — 76882 US LMTD JT/FCL EVL NVASC XTR: CPT | Performed by: SPECIALIST

## 2025-06-04 PROCEDURE — 1036F TOBACCO NON-USER: CPT | Performed by: SPECIALIST

## 2025-06-04 PROCEDURE — 1125F AMNT PAIN NOTED PAIN PRSNT: CPT | Performed by: SPECIALIST

## 2025-06-04 ASSESSMENT — PAIN - FUNCTIONAL ASSESSMENT: PAIN_FUNCTIONAL_ASSESSMENT: 0-10

## 2025-06-04 ASSESSMENT — PAIN SCALES - GENERAL: PAINLEVEL_OUTOF10: 7

## 2025-06-04 ASSESSMENT — PAIN DESCRIPTION - DESCRIPTORS: DESCRIPTORS: ACHING;DULL

## 2025-06-04 NOTE — PROGRESS NOTES
Assessment/Plan   Encounter Diagnoses:  Left knee pain, unspecified chronicity    Acute pain of right knee  Right knee pain  Assessment: Bilateral leg swelling which is being treated by his medical doctor with furosemide.  Right knee medial arthritis      Plan:  Continue with the Lasix treatment per medicine  Half an hour of elevation of his legs morning and afternoon may help with the mobilization of the fluid.  Gentle range of motion and strengthening of the knees.  Continue with the walker.  Follow-up in 6 to 8 weeks for reevaluation.  Consider cortisone injection at that time.  Continue to use the Voltaren which they have.       Subjective    Patient ID: Carmen Vinson is a 89 y.o. male.    Chief Complaint: Pain and New Patient Visit of the Right Knee     Last Surgery: No surgery found  Last Surgery Date: No surgery found    HPI  89-year-old who is seen in the office today with his son.  He has had bilateral swelling of his legs for 4 to 5 years according to the son.  Recently his internist started him on 80 mg of Lasix in the morning and 40 in the afternoon to try to relieve some of the bilateral swelling.  He is complaining of medial joint line tenderness in the knee.    OBJECTIVE: ORTHO EXAM  Right knee Exam    Hip motion is painless with flexion, internal and external rotation.     The skin is intact about the knee.  The extensor mechanism is intact. No Quadriceps, Posterior Thigh or Calf Atrophy is noted.  Incisions: None  Alignment: Varus with approximately 20 degrees standing alignment noted.   No Flexion Contracture or Recurvatum is noted.  There is minimal effusion.  There is no erythema or warmth.  Range of motion: Ext 0, Flex 110  Pain with patellar compression positive  Medial joint line pain moderate  Lateral joint line pain normal  Varus and valgus stressing 0 and 30 degrees: Corrects with Valgus stress to neutral degrees  Lachman's: Negative  Anterior Drawer Negative  Posterior Drawer  Negative  Calves are NT to palpation bilaterally  Edema Distal: Negative     Intact ankle dorsiflexion and plantarflexion.  Distal pulse 2+ palpable.      IMAGE RESULTS:  Point of Care Ultrasound  These images are not reportable by radiology and will not be interpreted   by  Radiologists.      ULTRASOUND  DIAGNOSTIC ULTRASOUND REPORT FINAL: Right KNEE  Sonographer: Mickey Au MD  Indication: Knee Pain  Procedure: Ultrasound, extremity, nonvascular, real-time, COMPLETE, anatomic specific  Technique: B-Mode Ultrasound Examination performed using 6- 9 MHz linear transducer with Present Software  STUDY TYPE:   1. ULTRASOUND EXTREMITY  2. REAL TIME WITH IMAGE DOCUMENTATION  3. NON-VASCULAR  4. COMPLETE STUDY, INCLUDING BUT NOT LIMITED TO MUSCLE, TENDONS, LIGAMENTS, SOFT TISSUES, ADIPOSE TISSUE AND SUBCUTANEOUS TISSUE.  Site: KNEE   Live ultrasound was performed with of patient's  KNEE and PERMANENTLY documented. I personally performed the ultrasound and reviewed the findings. These show:    Tracie-articular evaluation:   An intact Quadriceps Tendon with the Quadriceps Muscle fibers showing normal striations Quadriceps Tendon demonstrating normal fibrillar pattern. . The Patellar Tendon demonstrates normal fibrillar pattern and is intact.   No significant soft tissue fluid collection/abscess appreciated.     Joint Evaluation:  The lateral joint line shows an intact LCL.   Medial joint line exam shows an intact MCL.   The patellar tendon was within normal limits.  Scant joint effusion noted.     The patient tolerated the procedure well.        Procedures     Orders Placed This Encounter    Point of Care Ultrasound

## 2025-06-04 NOTE — ASSESSMENT & PLAN NOTE
Assessment: Bilateral leg swelling which is being treated by his medical doctor with furosemide.  Right knee medial arthritis      Plan:  Continue with the Lasix treatment per medicine  Half an hour of elevation of his legs morning and afternoon may help with the mobilization of the fluid.  Gentle range of motion and strengthening of the knees.  Continue with the walker.  Follow-up in 6 to 8 weeks for reevaluation.  Consider cortisone injection at that time.  Continue to use the Voltaren which they have.

## 2025-07-16 ENCOUNTER — HOSPITAL ENCOUNTER (OUTPATIENT)
Dept: RADIOLOGY | Facility: EXTERNAL LOCATION | Age: OVER 89
Discharge: HOME | End: 2025-07-16

## 2025-07-16 ENCOUNTER — APPOINTMENT (OUTPATIENT)
Dept: ORTHOPEDIC SURGERY | Facility: CLINIC | Age: OVER 89
End: 2025-07-16
Payer: MEDICARE

## 2025-07-16 DIAGNOSIS — M25.561 ACUTE PAIN OF RIGHT KNEE: ICD-10-CM

## 2025-07-16 PROCEDURE — 99214 OFFICE O/P EST MOD 30 MIN: CPT | Performed by: SPECIALIST

## 2025-07-16 ASSESSMENT — PAIN SCALES - GENERAL: PAINLEVEL_OUTOF10: 5 - MODERATE PAIN

## 2025-07-16 ASSESSMENT — PAIN DESCRIPTION - DESCRIPTORS: DESCRIPTORS: ACHING

## 2025-07-16 ASSESSMENT — PAIN - FUNCTIONAL ASSESSMENT: PAIN_FUNCTIONAL_ASSESSMENT: 0-10

## 2025-07-16 NOTE — PROGRESS NOTES
Assessment/Plan   Encounter Diagnoses:  Acute pain of right knee  Right knee pain  Right knee pain-arthritis  Is dependent edema is significantly better.  His intra-articular effusion was also improved.    Plan:    Under ultrasound control 40 mg of Kenalog was injected into the knee.  He had a good lidocaine suppression test.  Follow-up in 6 weeks for reevaluation.       Subjective    Patient ID: Carmen Vinson is a 89 y.o. male.    Chief Complaint: Pain and Follow-up of the Right Knee     Last Surgery: No surgery found  Last Surgery Date: No surgery found    HPI  89-year-old who is seen in the office today with his son.  He has had bilateral swelling of his legs for 4 to 5 years according to the son.  Recently his internist started him on 80 mg of Lasix in the morning and 40 in the afternoon to try to relieve some of the bilateral swelling.  He is complaining of medial joint line tenderness in the knee.      7/16/2025-he is much better with regard to the dependent edema.  Some of the fluid in his knee also resolved.  But he still has an effusion and medial joint line tenderness.      OBJECTIVE: ORTHO EXAM  Right knee Exam    Hip motion is painless with flexion, internal and external rotation.     The skin is intact about the knee.  The extensor mechanism is intact. No Quadriceps, Posterior Thigh or Calf Atrophy is noted.  Incisions: None  Alignment: Varus with approximately 20 degrees standing alignment noted.   No Flexion Contracture or Recurvatum is noted.  There is minimal effusion.  There is no erythema or warmth.  Range of motion: Ext 0, Flex 110  Pain with patellar compression positive  Medial joint line pain moderate  Lateral joint line pain normal  Varus and valgus stressing 0 and 30 degrees: Corrects with Valgus stress to neutral degrees  Lachman's: Negative  Anterior Drawer Negative  Posterior Drawer Negative  Calves are NT to palpation bilaterally  Edema Distal: Negative     Intact ankle dorsiflexion and  plantarflexion.  Distal pulse 2+ palpable.      IMAGE RESULTS:  Point of Care Ultrasound  These images are not reportable by radiology and will not be interpreted   by  Radiologists.      ULTRASOUND  DIAGNOSTIC ULTRASOUND REPORT FINAL: Right KNEE  Sonographer: Mickey Au MD  Indication: Knee Pain  Procedure: Ultrasound, extremity, nonvascular, real-time, COMPLETE, anatomic specific  Technique: B-Mode Ultrasound Examination performed using 6- 9 MHz linear transducer with [employer] Software  STUDY TYPE:   1. ULTRASOUND EXTREMITY  2. REAL TIME WITH IMAGE DOCUMENTATION  3. NON-VASCULAR  4. COMPLETE STUDY, INCLUDING BUT NOT LIMITED TO MUSCLE, TENDONS, LIGAMENTS, SOFT TISSUES, ADIPOSE TISSUE AND SUBCUTANEOUS TISSUE.  Site: KNEE   Live ultrasound was performed with of patient's  KNEE and PERMANENTLY documented. I personally performed the ultrasound and reviewed the findings. These show:    Tracie-articular evaluation:   An intact Quadriceps Tendon with the Quadriceps Muscle fibers showing normal striations Quadriceps Tendon demonstrating normal fibrillar pattern. . The Patellar Tendon demonstrates normal fibrillar pattern and is intact.   No significant soft tissue fluid collection/abscess appreciated.     Joint Evaluation:  The lateral joint line shows an intact LCL.   Medial joint line exam shows an intact MCL.   The patellar tendon was within normal limits.  Minimal to mild joint effusion noted.     The patient tolerated the procedure well.        L Inj/Asp: R knee on 7/16/2025 12:05 PM  Indications: pain  Details: 18 G needle, ultrasound-guided superolateral approach  Procedure, treatment alternatives, risks and benefits explained, specific risks discussed. Consent was given by the patient. Immediately prior to procedure a time out was called to verify the correct patient, procedure, equipment, support staff and site/side marked as required. Patient was prepped and draped in the usual sterile fashion.            Orders  Placed This Encounter    Point of Care Ultrasound

## 2025-07-16 NOTE — ASSESSMENT & PLAN NOTE
Right knee pain-arthritis  Is dependent edema is significantly better.  His intra-articular effusion was also improved.    Plan:    Under ultrasound control 40 mg of Kenalog was injected into the knee.  He had a good lidocaine suppression test.  Follow-up in 6 weeks for reevaluation.

## 2025-07-17 ENCOUNTER — SPECIALTY PHARMACY (OUTPATIENT)
Dept: PHARMACY | Facility: CLINIC | Age: OVER 89
End: 2025-07-17

## 2025-07-17 DIAGNOSIS — M17.11 ARTHRITIS OF KNEE, RIGHT: ICD-10-CM

## 2025-07-18 LAB
ANION GAP SERPL CALCULATED.4IONS-SCNC: 13 MMOL/L (CALC) (ref 7–17)
BUN SERPL-MCNC: 29 MG/DL (ref 7–25)
BUN/CREAT SERPL: 18 (CALC) (ref 6–22)
CALCIUM SERPL-MCNC: 9.8 MG/DL (ref 8.6–10.3)
CHLORIDE SERPL-SCNC: 103 MMOL/L (ref 98–110)
CO2 SERPL-SCNC: 23 MMOL/L (ref 20–32)
CREAT SERPL-MCNC: 1.59 MG/DL (ref 0.7–1.22)
EGFRCR SERPLBLD CKD-EPI 2021: 39 ML/MIN/1.73M2
GLUCOSE SERPL-MCNC: 201 MG/DL (ref 65–99)
POTASSIUM SERPL-SCNC: 3.9 MMOL/L (ref 3.5–5.3)
SODIUM SERPL-SCNC: 139 MMOL/L (ref 135–146)

## 2025-07-23 DIAGNOSIS — I10 HTN (HYPERTENSION), BENIGN: ICD-10-CM

## 2025-07-23 RX ORDER — FUROSEMIDE 80 MG/1
80 TABLET ORAL DAILY
Qty: 90 TABLET | Refills: 3 | Status: SHIPPED | OUTPATIENT
Start: 2025-07-23 | End: 2025-07-24 | Stop reason: SDUPTHER

## 2025-07-24 ENCOUNTER — APPOINTMENT (OUTPATIENT)
Age: OVER 89
End: 2025-07-24
Payer: MEDICARE

## 2025-07-24 VITALS
OXYGEN SATURATION: 97 % | WEIGHT: 161 LBS | SYSTOLIC BLOOD PRESSURE: 122 MMHG | DIASTOLIC BLOOD PRESSURE: 56 MMHG | BODY MASS INDEX: 26.79 KG/M2 | HEART RATE: 84 BPM

## 2025-07-24 DIAGNOSIS — I10 HTN (HYPERTENSION), BENIGN: ICD-10-CM

## 2025-07-24 DIAGNOSIS — E78.2 MIXED HYPERLIPIDEMIA: ICD-10-CM

## 2025-07-24 DIAGNOSIS — N18.32 CKD STAGE 3B, GFR 30-44 ML/MIN (MULTI): ICD-10-CM

## 2025-07-24 DIAGNOSIS — I25.10 CORONARY ARTERY DISEASE INVOLVING NATIVE HEART WITHOUT ANGINA PECTORIS, UNSPECIFIED VESSEL OR LESION TYPE: ICD-10-CM

## 2025-07-24 DIAGNOSIS — E11.9 TYPE 2 DIABETES MELLITUS WITHOUT COMPLICATION, WITHOUT LONG-TERM CURRENT USE OF INSULIN: Primary | ICD-10-CM

## 2025-07-24 DIAGNOSIS — N40.1 BENIGN PROSTATIC HYPERPLASIA WITH LOWER URINARY TRACT SYMPTOMS, SYMPTOM DETAILS UNSPECIFIED: ICD-10-CM

## 2025-07-24 DIAGNOSIS — E03.9 ACQUIRED HYPOTHYROIDISM: ICD-10-CM

## 2025-07-24 PROCEDURE — 99213 OFFICE O/P EST LOW 20 MIN: CPT | Performed by: FAMILY MEDICINE

## 2025-07-24 PROCEDURE — 1036F TOBACCO NON-USER: CPT | Performed by: FAMILY MEDICINE

## 2025-07-24 PROCEDURE — 1159F MED LIST DOCD IN RCRD: CPT | Performed by: FAMILY MEDICINE

## 2025-07-24 PROCEDURE — 1160F RVW MEDS BY RX/DR IN RCRD: CPT | Performed by: FAMILY MEDICINE

## 2025-07-24 PROCEDURE — 3074F SYST BP LT 130 MM HG: CPT | Performed by: FAMILY MEDICINE

## 2025-07-24 PROCEDURE — 3078F DIAST BP <80 MM HG: CPT | Performed by: FAMILY MEDICINE

## 2025-07-24 RX ORDER — FUROSEMIDE 80 MG/1
80 TABLET ORAL DAILY
Qty: 270 TABLET | Refills: 3 | Status: SHIPPED | OUTPATIENT
Start: 2025-07-24 | End: 2025-07-25 | Stop reason: SDUPTHER

## 2025-07-24 RX ORDER — FUROSEMIDE 40 MG/1
TABLET ORAL
Qty: 90 TABLET | Refills: 11 | Status: SHIPPED | OUTPATIENT
Start: 2025-07-24 | End: 2025-07-25 | Stop reason: SDUPTHER

## 2025-07-24 RX ORDER — FINASTERIDE 5 MG/1
5 TABLET, FILM COATED ORAL DAILY
Qty: 90 TABLET | Refills: 3 | Status: SHIPPED | OUTPATIENT
Start: 2025-07-24

## 2025-07-24 NOTE — PROGRESS NOTES
Subjective   Patient ID: Carmen Vinson is a 89 y.o. male who presents for Follow-up (3 mo rev labs).    HPI since the last office visit there have been no interval operations, hospitalizations, important illnesses or injuries.  CAD-no angina  Peripheral edema/Lost 14#more from left leg than right leg.  Clarified prescriptions local and mail away for Lasix 80 in the morning 40 in the afternoon.  BMP reviewed showing 0.1 increase in creatinine and normal electrolytes  Urology senior care we will take over writing for the Proscar.  He is manage prostate cancer with injectable suppressing chemotherapy outside of this office  Got chemo from urology last week  Hypothyroid- is euthyroid on replacement. Thyroid ros is unremarkable.  Is diabetic maintained on Actos 15 as monotherapy  Hyperlipidemia- on statin and prudent diet  HTN-Takes and tolerates meds without side effects. No alcohol. no tobacco. no exercise. low salt.  Reviewed recommendation for 150 minutes of exercise per week including 2 days of weight training if over age 50    Review of Systems  Denies chest pains pressure heaviness or change in his limited activity tolerance  Objective   /56   Pulse 84   Wt 73 kg (161 lb)   SpO2 97%   BMI 26.79 kg/m²     Physical Exam  No bruit thyroid nontender no adenopathy in neck heart regular lungs clear 2+ edema bilaterally  Assessment/Plan   Problem List Items Addressed This Visit           ICD-10-CM    BPH (benign prostatic hyperplasia) N40.0    Relevant Medications    finasteride (Proscar) 5 mg tablet    CAD (coronary artery disease) I25.10    Relevant Medications    furosemide (Lasix) 40 mg tablet    furosemide (Lasix) 80 mg tablet    finasteride (Proscar) 5 mg tablet    Diabetes mellitus (Multi) - Primary E11.9    Relevant Orders    Follow Up In Primary Care    Comprehensive Metabolic Panel    CBC    Albumin-Creatinine Ratio, Urine Random    Hemoglobin A1C    HTN (hypertension), benign I10    Relevant  Medications    furosemide (Lasix) 40 mg tablet    furosemide (Lasix) 80 mg tablet    Other Relevant Orders    Follow Up In Primary Care    Comprehensive Metabolic Panel    CBC    Hyperlipidemia E78.5    Hypothyroid E03.9    CKD stage 3b, GFR 30-44 ml/min (Multi) N18.32    Relevant Medications    furosemide (Lasix) 40 mg tablet    furosemide (Lasix) 80 mg tablet    Other Relevant Orders    Follow Up In Primary Care    Comprehensive Metabolic Panel    CBC

## 2025-07-25 ENCOUNTER — SPECIALTY PHARMACY (OUTPATIENT)
Dept: PHARMACY | Facility: CLINIC | Age: OVER 89
End: 2025-07-25

## 2025-07-25 DIAGNOSIS — N18.32 CKD STAGE 3B, GFR 30-44 ML/MIN (MULTI): ICD-10-CM

## 2025-07-25 DIAGNOSIS — I10 HTN (HYPERTENSION), BENIGN: ICD-10-CM

## 2025-07-25 RX ORDER — FUROSEMIDE 80 MG/1
80 TABLET ORAL DAILY
Qty: 270 TABLET | Refills: 3 | Status: SHIPPED | OUTPATIENT
Start: 2025-07-25

## 2025-07-25 RX ORDER — FUROSEMIDE 40 MG/1
TABLET ORAL
Qty: 90 TABLET | Refills: 11 | Status: SHIPPED | OUTPATIENT
Start: 2025-07-25

## 2025-07-28 ENCOUNTER — TELEPHONE (OUTPATIENT)
Age: OVER 89
End: 2025-07-28
Payer: MEDICARE

## 2025-07-28 NOTE — TELEPHONE ENCOUNTER
JOI FROM  [facility]   CALLING.  RECEIVED 2 SCRIPTS FOR FUROSEMIDE 40 MG   TAKING 2  Q A, AND 1 AT NOON.   ALSO  RECEIVED    FUROSEMIDE 80 MG   1 DAILY.   WHICH IS CORRECT ?

## 2025-08-06 ENCOUNTER — TELEPHONE (OUTPATIENT)
Dept: ORTHOPEDIC SURGERY | Facility: CLINIC | Age: OVER 89
End: 2025-08-06
Payer: MEDICARE

## 2025-08-06 NOTE — TELEPHONE ENCOUNTER
LM FOR PATIENT LETTING HIM KNOW THAT HIS GEL INJECTIONS ARE IN THE OFFICE AND READY TO BE SCHEDULED.

## 2025-08-13 ENCOUNTER — APPOINTMENT (OUTPATIENT)
Dept: ORTHOPEDIC SURGERY | Facility: CLINIC | Age: OVER 89
End: 2025-08-13
Payer: MEDICARE

## 2025-08-13 ENCOUNTER — HOSPITAL ENCOUNTER (OUTPATIENT)
Dept: RADIOLOGY | Facility: EXTERNAL LOCATION | Age: OVER 89
Discharge: HOME | End: 2025-08-13

## 2025-08-13 DIAGNOSIS — M25.561 ACUTE PAIN OF RIGHT KNEE: ICD-10-CM

## 2025-08-13 PROCEDURE — 1159F MED LIST DOCD IN RCRD: CPT | Performed by: SPECIALIST

## 2025-08-13 PROCEDURE — 1036F TOBACCO NON-USER: CPT | Performed by: SPECIALIST

## 2025-08-13 PROCEDURE — 20611 DRAIN/INJ JOINT/BURSA W/US: CPT | Performed by: SPECIALIST

## 2025-08-13 PROCEDURE — 99214 OFFICE O/P EST MOD 30 MIN: CPT | Performed by: SPECIALIST

## 2025-08-13 PROCEDURE — 1160F RVW MEDS BY RX/DR IN RCRD: CPT | Performed by: SPECIALIST

## 2025-08-13 ASSESSMENT — PAIN SCALES - GENERAL: PAINLEVEL_OUTOF10: 10 - WORST POSSIBLE PAIN

## 2025-08-13 ASSESSMENT — PAIN - FUNCTIONAL ASSESSMENT: PAIN_FUNCTIONAL_ASSESSMENT: 0-10

## 2025-08-13 ASSESSMENT — PAIN DESCRIPTION - DESCRIPTORS: DESCRIPTORS: SHARP

## 2025-08-20 ENCOUNTER — HOSPITAL ENCOUNTER (OUTPATIENT)
Dept: RADIOLOGY | Facility: EXTERNAL LOCATION | Age: OVER 89
Discharge: HOME | End: 2025-08-20

## 2025-08-20 ENCOUNTER — APPOINTMENT (OUTPATIENT)
Dept: ORTHOPEDIC SURGERY | Facility: CLINIC | Age: OVER 89
End: 2025-08-20
Payer: MEDICARE

## 2025-08-20 DIAGNOSIS — M25.561 ACUTE PAIN OF RIGHT KNEE: ICD-10-CM

## 2025-08-20 PROCEDURE — 20611 DRAIN/INJ JOINT/BURSA W/US: CPT | Performed by: SPECIALIST

## 2025-08-20 PROCEDURE — 1159F MED LIST DOCD IN RCRD: CPT | Performed by: SPECIALIST

## 2025-08-20 PROCEDURE — 1036F TOBACCO NON-USER: CPT | Performed by: SPECIALIST

## 2025-08-20 PROCEDURE — 1160F RVW MEDS BY RX/DR IN RCRD: CPT | Performed by: SPECIALIST

## 2025-08-20 ASSESSMENT — PAIN - FUNCTIONAL ASSESSMENT: PAIN_FUNCTIONAL_ASSESSMENT: 0-10

## 2025-08-27 ENCOUNTER — APPOINTMENT (OUTPATIENT)
Dept: ORTHOPEDIC SURGERY | Facility: CLINIC | Age: OVER 89
End: 2025-08-27
Payer: MEDICARE

## 2025-08-27 ENCOUNTER — HOSPITAL ENCOUNTER (OUTPATIENT)
Dept: RADIOLOGY | Facility: EXTERNAL LOCATION | Age: OVER 89
Discharge: HOME | End: 2025-08-27

## 2025-08-27 DIAGNOSIS — M17.11 ARTHRITIS OF RIGHT KNEE: ICD-10-CM

## 2025-08-27 PROCEDURE — 20611 DRAIN/INJ JOINT/BURSA W/US: CPT | Performed by: SPECIALIST

## 2025-08-27 PROCEDURE — 1160F RVW MEDS BY RX/DR IN RCRD: CPT | Performed by: SPECIALIST

## 2025-08-27 PROCEDURE — 1125F AMNT PAIN NOTED PAIN PRSNT: CPT | Performed by: SPECIALIST

## 2025-08-27 PROCEDURE — 1159F MED LIST DOCD IN RCRD: CPT | Performed by: SPECIALIST

## 2025-08-27 PROCEDURE — 1036F TOBACCO NON-USER: CPT | Performed by: SPECIALIST

## 2025-08-27 ASSESSMENT — PAIN SCALES - GENERAL: PAINLEVEL_OUTOF10: 8

## 2025-08-27 ASSESSMENT — PAIN - FUNCTIONAL ASSESSMENT: PAIN_FUNCTIONAL_ASSESSMENT: 0-10

## 2025-08-28 ENCOUNTER — OFFICE VISIT (OUTPATIENT)
Age: OVER 89
End: 2025-08-28
Payer: MEDICARE

## 2025-08-28 VITALS — OXYGEN SATURATION: 99 % | BODY MASS INDEX: 27.76 KG/M2 | HEART RATE: 63 BPM | WEIGHT: 166.8 LBS

## 2025-08-28 DIAGNOSIS — R60.9 EDEMA, UNSPECIFIED TYPE: ICD-10-CM

## 2025-08-28 DIAGNOSIS — S89.91XA INJURY OF RIGHT LOWER EXTREMITY, INITIAL ENCOUNTER: Primary | ICD-10-CM

## 2025-08-28 DIAGNOSIS — R60.0 UNILATERAL EDEMA OF LOWER EXTREMITY: ICD-10-CM

## 2025-08-28 PROCEDURE — 1036F TOBACCO NON-USER: CPT | Performed by: FAMILY MEDICINE

## 2025-08-28 PROCEDURE — 1160F RVW MEDS BY RX/DR IN RCRD: CPT | Performed by: FAMILY MEDICINE

## 2025-08-28 PROCEDURE — 1159F MED LIST DOCD IN RCRD: CPT | Performed by: FAMILY MEDICINE

## 2025-08-28 PROCEDURE — 99214 OFFICE O/P EST MOD 30 MIN: CPT | Performed by: FAMILY MEDICINE

## 2025-08-29 ENCOUNTER — RESULTS FOLLOW-UP (OUTPATIENT)
Age: OVER 89
End: 2025-08-29

## 2025-08-29 ENCOUNTER — HOSPITAL ENCOUNTER (OUTPATIENT)
Dept: VASCULAR MEDICINE | Facility: HOSPITAL | Age: OVER 89
Discharge: HOME | End: 2025-08-29
Payer: MEDICARE

## 2025-08-29 DIAGNOSIS — M79.661 PAIN IN RIGHT LOWER LEG: ICD-10-CM

## 2025-08-29 DIAGNOSIS — R60.0 UNILATERAL EDEMA OF LOWER EXTREMITY: ICD-10-CM

## 2025-08-29 DIAGNOSIS — R60.9 EDEMA, UNSPECIFIED TYPE: ICD-10-CM

## 2025-08-29 DIAGNOSIS — S89.91XA INJURY OF RIGHT LOWER EXTREMITY, INITIAL ENCOUNTER: ICD-10-CM

## 2025-08-29 PROCEDURE — 93971 EXTREMITY STUDY: CPT

## 2025-08-29 PROCEDURE — 93971 EXTREMITY STUDY: CPT | Performed by: INTERNAL MEDICINE

## 2025-09-03 ENCOUNTER — APPOINTMENT (OUTPATIENT)
Dept: ORTHOPEDIC SURGERY | Facility: CLINIC | Age: OVER 89
End: 2025-09-03
Payer: MEDICARE

## 2025-09-04 ENCOUNTER — TELEPHONE (OUTPATIENT)
Age: OVER 89
End: 2025-09-04
Payer: MEDICARE

## 2025-10-28 ENCOUNTER — APPOINTMENT (OUTPATIENT)
Age: OVER 89
End: 2025-10-28
Payer: MEDICARE

## 2025-11-12 ENCOUNTER — APPOINTMENT (OUTPATIENT)
Dept: UROLOGY | Facility: CLINIC | Age: OVER 89
End: 2025-11-12
Payer: MEDICARE

## 2025-11-19 ENCOUNTER — APPOINTMENT (OUTPATIENT)
Dept: ORTHOPEDIC SURGERY | Facility: CLINIC | Age: OVER 89
End: 2025-11-19
Payer: MEDICARE